# Patient Record
Sex: FEMALE | Race: WHITE | Employment: PART TIME | URBAN - METROPOLITAN AREA
[De-identification: names, ages, dates, MRNs, and addresses within clinical notes are randomized per-mention and may not be internally consistent; named-entity substitution may affect disease eponyms.]

---

## 2023-03-27 ENCOUNTER — TELEPHONE (OUTPATIENT)
Dept: PRIMARY CARE | Facility: CLINIC | Age: 73
End: 2023-03-27
Payer: MEDICARE

## 2023-03-27 DIAGNOSIS — J01.90 ACUTE SINUSITIS, RECURRENCE NOT SPECIFIED, UNSPECIFIED LOCATION: Primary | ICD-10-CM

## 2023-03-27 RX ORDER — AZITHROMYCIN 250 MG/1
TABLET, FILM COATED ORAL
Qty: 6 TABLET | Refills: 0 | Status: SHIPPED | OUTPATIENT
Start: 2023-03-27 | End: 2023-04-01

## 2023-03-27 NOTE — TELEPHONE ENCOUNTER
Pt is requesting an appointment with Dr. Milligan. Pt has sinus congestion, productive cough and fatigue. Pt states that she has no fever and that symptoms began about 3/19. Pt says that she usually gets spring allergies and believes that she has a sinus infection. Please advise.   Pt phone # [phone]

## 2023-08-31 ENCOUNTER — TELEPHONE (OUTPATIENT)
Dept: PRIMARY CARE | Facility: CLINIC | Age: 73
End: 2023-08-31
Payer: MEDICARE

## 2023-08-31 DIAGNOSIS — J01.90 ACUTE SINUSITIS, RECURRENCE NOT SPECIFIED, UNSPECIFIED LOCATION: Primary | ICD-10-CM

## 2023-08-31 RX ORDER — AMOXICILLIN 875 MG/1
875 TABLET, FILM COATED ORAL 2 TIMES DAILY
Qty: 20 TABLET | Refills: 0 | Status: SHIPPED | OUTPATIENT
Start: 2023-08-31 | End: 2023-09-10

## 2023-08-31 NOTE — TELEPHONE ENCOUNTER
Per pt, she has been having congestion for a week, discolored mucus, headaches, fatigue, dry cough, sore throat. She has no fever, no body aches, no chills. She had a negative test 2 days ago. She has been taking Tylenol for sinus a couple of times a day. She is asking if anything can be called in.

## 2023-10-05 ENCOUNTER — PATIENT OUTREACH (OUTPATIENT)
Dept: CARE COORDINATION | Facility: CLINIC | Age: 73
End: 2023-10-05
Payer: MEDICARE

## 2023-10-05 NOTE — PROGRESS NOTES
Left message for patient to assist in scheduling Annual Wellness Exam.   Introduced myself as their Patient Navigator for their PCP office and how I can assist them in the future.     I asked patient to call back directly at [phone] ; I am able to assist in scheduling Annual Wellness Exam along with any other resources they might need.     Patient Outreach Tracker Noted

## 2023-12-01 ENCOUNTER — LAB (OUTPATIENT)
Dept: LAB | Facility: LAB | Age: 73
End: 2023-12-01
Payer: MEDICARE

## 2023-12-01 DIAGNOSIS — R10.84 GENERALIZED ABDOMINAL PAIN: ICD-10-CM

## 2023-12-01 LAB
CREAT SERPL-MCNC: 0.63 MG/DL (ref 0.5–1.05)
GFR SERPL CREATININE-BSD FRML MDRD: >90 ML/MIN/1.73M*2

## 2023-12-01 PROCEDURE — 36415 COLL VENOUS BLD VENIPUNCTURE: CPT

## 2023-12-01 PROCEDURE — 82565 ASSAY OF CREATININE: CPT

## 2023-12-11 ENCOUNTER — HOSPITAL ENCOUNTER (OUTPATIENT)
Dept: RADIOLOGY | Facility: HOSPITAL | Age: 73
Discharge: HOME | End: 2023-12-11
Payer: MEDICARE

## 2023-12-11 DIAGNOSIS — R10.84 GENERALIZED ABDOMINAL PAIN: ICD-10-CM

## 2023-12-11 PROCEDURE — 2550000001 HC RX 255 CONTRASTS: Performed by: FAMILY MEDICINE

## 2023-12-11 PROCEDURE — 74177 CT ABD & PELVIS W/CONTRAST: CPT

## 2023-12-11 PROCEDURE — 74177 CT ABD & PELVIS W/CONTRAST: CPT | Performed by: STUDENT IN AN ORGANIZED HEALTH CARE EDUCATION/TRAINING PROGRAM

## 2023-12-11 RX ADMIN — IOHEXOL 75 ML: 350 INJECTION, SOLUTION INTRAVENOUS at 16:32

## 2023-12-14 ENCOUNTER — TELEPHONE (OUTPATIENT)
Dept: PRIMARY CARE | Facility: CLINIC | Age: 73
End: 2023-12-14
Payer: MEDICARE

## 2023-12-14 DIAGNOSIS — E78.2 MIXED HYPERLIPIDEMIA: Primary | ICD-10-CM

## 2023-12-14 DIAGNOSIS — Z00.00 HEALTHCARE MAINTENANCE: ICD-10-CM

## 2023-12-14 DIAGNOSIS — E03.9 ACQUIRED HYPOTHYROIDISM: ICD-10-CM

## 2023-12-14 PROBLEM — E78.5 HYPERLIPIDEMIA: Status: ACTIVE | Noted: 2023-12-14

## 2023-12-14 PROBLEM — M85.80 AGE-RELATED BONE LOSS: Status: ACTIVE | Noted: 2023-12-14

## 2023-12-18 ENCOUNTER — LAB (OUTPATIENT)
Dept: LAB | Facility: LAB | Age: 73
End: 2023-12-18
Payer: MEDICARE

## 2023-12-18 DIAGNOSIS — Z00.00 HEALTHCARE MAINTENANCE: ICD-10-CM

## 2023-12-18 DIAGNOSIS — E03.9 ACQUIRED HYPOTHYROIDISM: ICD-10-CM

## 2023-12-18 DIAGNOSIS — E03.9 HYPOTHYROIDISM, UNSPECIFIED: ICD-10-CM

## 2023-12-18 DIAGNOSIS — E78.2 MIXED HYPERLIPIDEMIA: ICD-10-CM

## 2023-12-18 LAB
ALBUMIN SERPL BCP-MCNC: 4.1 G/DL (ref 3.4–5)
ALP SERPL-CCNC: 74 U/L (ref 33–136)
ALT SERPL W P-5'-P-CCNC: 10 U/L (ref 7–45)
ANION GAP SERPL CALC-SCNC: 11 MMOL/L (ref 10–20)
AST SERPL W P-5'-P-CCNC: 14 U/L (ref 9–39)
BILIRUB SERPL-MCNC: 0.7 MG/DL (ref 0–1.2)
BUN SERPL-MCNC: 15 MG/DL (ref 6–23)
CALCIUM SERPL-MCNC: 9 MG/DL (ref 8.6–10.3)
CHLORIDE SERPL-SCNC: 105 MMOL/L (ref 98–107)
CHOLEST SERPL-MCNC: 178 MG/DL (ref 0–199)
CHOLESTEROL/HDL RATIO: 2.4
CO2 SERPL-SCNC: 29 MMOL/L (ref 21–32)
CREAT SERPL-MCNC: 0.67 MG/DL (ref 0.5–1.05)
ERYTHROCYTE [DISTWIDTH] IN BLOOD BY AUTOMATED COUNT: 13.1 % (ref 11.5–14.5)
GFR SERPL CREATININE-BSD FRML MDRD: >90 ML/MIN/1.73M*2
GLUCOSE SERPL-MCNC: 98 MG/DL (ref 74–99)
HCT VFR BLD AUTO: 44.1 % (ref 36–46)
HDLC SERPL-MCNC: 75.4 MG/DL
HGB BLD-MCNC: 14 G/DL (ref 12–16)
LDLC SERPL CALC-MCNC: 85 MG/DL
MCH RBC QN AUTO: 29.2 PG (ref 26–34)
MCHC RBC AUTO-ENTMCNC: 31.7 G/DL (ref 32–36)
MCV RBC AUTO: 92 FL (ref 80–100)
NON HDL CHOLESTEROL: 103 MG/DL (ref 0–149)
NRBC BLD-RTO: 0 /100 WBCS (ref 0–0)
PLATELET # BLD AUTO: 319 X10*3/UL (ref 150–450)
POTASSIUM SERPL-SCNC: 4.4 MMOL/L (ref 3.5–5.3)
PROT SERPL-MCNC: 6.6 G/DL (ref 6.4–8.2)
RBC # BLD AUTO: 4.79 X10*6/UL (ref 4–5.2)
SODIUM SERPL-SCNC: 141 MMOL/L (ref 136–145)
TRIGL SERPL-MCNC: 89 MG/DL (ref 0–149)
TSH SERPL-ACNC: 0.88 MIU/L (ref 0.44–3.98)
VLDL: 18 MG/DL (ref 0–40)
WBC # BLD AUTO: 5.2 X10*3/UL (ref 4.4–11.3)

## 2023-12-18 PROCEDURE — 80061 LIPID PANEL: CPT

## 2023-12-18 PROCEDURE — 85027 COMPLETE CBC AUTOMATED: CPT

## 2023-12-18 PROCEDURE — 84443 ASSAY THYROID STIM HORMONE: CPT

## 2023-12-18 PROCEDURE — 36415 COLL VENOUS BLD VENIPUNCTURE: CPT

## 2023-12-18 PROCEDURE — 80053 COMPREHEN METABOLIC PANEL: CPT

## 2023-12-18 RX ORDER — LEVOTHYROXINE SODIUM 88 UG/1
88 TABLET ORAL DAILY
Qty: 90 TABLET | Refills: 3 | Status: SHIPPED | OUTPATIENT
Start: 2023-12-18

## 2023-12-20 ENCOUNTER — OFFICE VISIT (OUTPATIENT)
Dept: PRIMARY CARE | Facility: CLINIC | Age: 73
End: 2023-12-20
Payer: MEDICARE

## 2023-12-20 VITALS
TEMPERATURE: 97.8 F | SYSTOLIC BLOOD PRESSURE: 124 MMHG | DIASTOLIC BLOOD PRESSURE: 76 MMHG | BODY MASS INDEX: 23.07 KG/M2 | RESPIRATION RATE: 16 BRPM | WEIGHT: 147 LBS | HEART RATE: 74 BPM | HEIGHT: 67 IN | OXYGEN SATURATION: 98 %

## 2023-12-20 DIAGNOSIS — K42.9 UMBILICAL HERNIA WITHOUT OBSTRUCTION AND WITHOUT GANGRENE: ICD-10-CM

## 2023-12-20 DIAGNOSIS — R05.9 COUGH, UNSPECIFIED TYPE: ICD-10-CM

## 2023-12-20 DIAGNOSIS — Z00.00 ROUTINE GENERAL MEDICAL EXAMINATION AT HEALTH CARE FACILITY: Primary | ICD-10-CM

## 2023-12-20 PROCEDURE — 1125F AMNT PAIN NOTED PAIN PRSNT: CPT | Performed by: FAMILY MEDICINE

## 2023-12-20 PROCEDURE — 1159F MED LIST DOCD IN RCRD: CPT | Performed by: FAMILY MEDICINE

## 2023-12-20 PROCEDURE — G0439 PPPS, SUBSEQ VISIT: HCPCS | Performed by: FAMILY MEDICINE

## 2023-12-20 PROCEDURE — 1170F FXNL STATUS ASSESSED: CPT | Performed by: FAMILY MEDICINE

## 2023-12-20 PROCEDURE — 1036F TOBACCO NON-USER: CPT | Performed by: FAMILY MEDICINE

## 2023-12-20 RX ORDER — ALBUTEROL SULFATE 90 UG/1
2 AEROSOL, METERED RESPIRATORY (INHALATION) EVERY 4 HOURS PRN
Qty: 18 G | Refills: 3 | Status: SHIPPED | OUTPATIENT
Start: 2023-12-20

## 2023-12-20 RX ORDER — ALBUTEROL SULFATE 90 UG/1
2 AEROSOL, METERED RESPIRATORY (INHALATION) EVERY 4 HOURS PRN
COMMUNITY
Start: 2023-08-20 | End: 2023-12-20 | Stop reason: SDUPTHER

## 2023-12-20 RX ORDER — OMEPRAZOLE 40 MG/1
40 CAPSULE, DELAYED RELEASE ORAL AS NEEDED
COMMUNITY

## 2023-12-20 ASSESSMENT — ACTIVITIES OF DAILY LIVING (ADL)
GROCERY_SHOPPING: INDEPENDENT
TAKING_MEDICATION: INDEPENDENT
DOING_HOUSEWORK: INDEPENDENT
BATHING: INDEPENDENT
DRESSING: INDEPENDENT
MANAGING_FINANCES: INDEPENDENT

## 2023-12-20 ASSESSMENT — ENCOUNTER SYMPTOMS
OCCASIONAL FEELINGS OF UNSTEADINESS: 0
ENDOCRINE NEGATIVE: 1
RESPIRATORY NEGATIVE: 1
CONSTITUTIONAL NEGATIVE: 1
PSYCHIATRIC NEGATIVE: 1
GASTROINTESTINAL NEGATIVE: 1
LOSS OF SENSATION IN FEET: 0
DEPRESSION: 0
CARDIOVASCULAR NEGATIVE: 1
EYES NEGATIVE: 1
NEUROLOGICAL NEGATIVE: 1

## 2023-12-20 ASSESSMENT — PATIENT HEALTH QUESTIONNAIRE - PHQ9
2. FEELING DOWN, DEPRESSED OR HOPELESS: NOT AT ALL
SUM OF ALL RESPONSES TO PHQ9 QUESTIONS 1 AND 2: 0
1. LITTLE INTEREST OR PLEASURE IN DOING THINGS: NOT AT ALL

## 2023-12-20 NOTE — PROGRESS NOTES
"Subjective   Reason for Visit: Susannah Quintanilla is an 73 y.o. female here for a Medicare Wellness visit.          Reviewed all medications by prescribing practitioner or clinical pharmacist (such as prescriptions, OTCs, herbal therapies and supplements) and documented in the medical record.  Had an episode of dysphagia with food getting caught. Had EGD with dilation done. 11/29/23    Patient Care Team:  Nela Milligan DO as PCP - General  Nela Milligan DO as PCP - [facility] [employer] PCP     Review of Systems   Constitutional: Negative.    HENT: Negative.     Eyes: Negative.    Respiratory: Negative.     Cardiovascular: Negative.    Gastrointestinal: Negative.    Endocrine: Negative.    Genitourinary: Negative.    Skin: Negative.    Neurological: Negative.    Psychiatric/Behavioral: Negative.         Objective   Vitals:  /76 (BP Location: Left arm, Patient Position: Sitting)   Pulse 74   Temp 36.6 °C (97.8 °F)   Resp 16   Ht 1.702 m (5' 7\")   Wt 66.7 kg (147 lb)   SpO2 98%   BMI 23.02 kg/m²       Physical Exam  Constitutional:       General: She is not in acute distress.     Appearance: Normal appearance.   HENT:      Head: Normocephalic and atraumatic.      Right Ear: Tympanic membrane normal.      Left Ear: Tympanic membrane normal.      Nose: Nose normal.      Mouth/Throat:      Pharynx: Oropharynx is clear.   Eyes:      Conjunctiva/sclera: Conjunctivae normal.      Pupils: Pupils are equal, round, and reactive to light.   Neck:      Vascular: No carotid bruit.   Cardiovascular:      Rate and Rhythm: Normal rate and regular rhythm.      Heart sounds: Normal heart sounds.   Pulmonary:      Effort: Pulmonary effort is normal.      Breath sounds: Normal breath sounds.   Abdominal:      General: Bowel sounds are normal.      Palpations: Abdomen is soft.      Hernia: A hernia (umbilical) is present.   Musculoskeletal:      Cervical back: Neck supple. No tenderness.   Skin:     General: Skin is " warm and dry.   Neurological:      General: No focal deficit present.      Mental Status: She is alert.   Psychiatric:         Mood and Affect: Mood normal.         Behavior: Behavior normal.         Assessment/Plan   Problem List Items Addressed This Visit    None  Visit Diagnoses       Routine general medical examination at health care facility    -  Primary    Cough, unspecified type        Relevant Medications    albuterol 90 mcg/actuation inhaler    Umbilical hernia without obstruction and without gangrene        Relevant Orders    Referral to General Surgery        Dr. Baker

## 2023-12-21 ENCOUNTER — APPOINTMENT (OUTPATIENT)
Dept: PRIMARY CARE | Facility: CLINIC | Age: 73
End: 2023-12-21
Payer: MEDICARE

## 2024-01-04 ENCOUNTER — TELEPHONE (OUTPATIENT)
Dept: PRIMARY CARE | Facility: CLINIC | Age: 74
End: 2024-01-04
Payer: MEDICARE

## 2024-01-04 NOTE — TELEPHONE ENCOUNTER
Pt left a message on 's VM stating that she has tried to see the general surgeon she was referred to but she was told that he was an affiliate. She is asking for clarification.

## 2024-01-05 NOTE — TELEPHONE ENCOUNTER
Pt was called to clarify. She stated that when she called to schedule with Dr. Baker, the  told her he is an affiliate and he is with [facility]. She wants to make sure that she can still see him.

## 2024-02-20 ENCOUNTER — OFFICE VISIT (OUTPATIENT)
Dept: SURGERY | Facility: CLINIC | Age: 74
End: 2024-02-20
Payer: MEDICARE

## 2024-02-20 VITALS
DIASTOLIC BLOOD PRESSURE: 89 MMHG | HEIGHT: 67 IN | SYSTOLIC BLOOD PRESSURE: 154 MMHG | BODY MASS INDEX: 22.63 KG/M2 | HEART RATE: 67 BPM | OXYGEN SATURATION: 98 % | RESPIRATION RATE: 16 BRPM | WEIGHT: 144.2 LBS | TEMPERATURE: 97 F

## 2024-02-20 DIAGNOSIS — K42.9 UMBILICAL HERNIA WITHOUT OBSTRUCTION AND WITHOUT GANGRENE: Primary | ICD-10-CM

## 2024-02-20 DIAGNOSIS — K42.9 UMBILICAL HERNIA WITHOUT OBSTRUCTION AND WITHOUT GANGRENE: ICD-10-CM

## 2024-02-20 PROCEDURE — 99213 OFFICE O/P EST LOW 20 MIN: CPT | Performed by: SURGERY

## 2024-02-20 PROCEDURE — 1036F TOBACCO NON-USER: CPT | Performed by: SURGERY

## 2024-02-20 PROCEDURE — 1159F MED LIST DOCD IN RCRD: CPT | Performed by: SURGERY

## 2024-02-20 PROCEDURE — 1125F AMNT PAIN NOTED PAIN PRSNT: CPT | Performed by: SURGERY

## 2024-02-20 NOTE — PROGRESS NOTES
Susannah Quintanilla   [id number]   1950         Chief Complaint/    Umbilical hernia        HPI/    73-year-old female seen for an umbilical hernia    Patient has a hernia at umbilicus.  She is now about every year or 2.  Starting to get bigger and hurts    Patient is status post prior laparoscopic procedure via the umbilicus in the past        No past medical history on file.   Hypothyroid    Seasonal allergies    Denies hypertension, diabetes, coronary artery disease  Social History     Socioeconomic History    Marital status:      Spouse name: Not on file    Number of children: Not on file    Years of education: Not on file    Highest education level: Not on file   Occupational History    Not on file   Tobacco Use    Smoking status: Never    Smokeless tobacco: Never   Vaping Use    Vaping Use: Never used   Substance and Sexual Activity    Alcohol use: Not Currently    Drug use: Never    Sexual activity: Not on file   Other Topics Concern    Not on file   Social History Narrative    Not on file     Social Determinants of Health     Financial Resource Strain: Not on file   Food Insecurity: Not on file   Transportation Needs: Not on file   Physical Activity: Not on file   Stress: Not on file   Social Connections: Not on file   Intimate Partner Violence: Not on file   Housing Stability: Not on file        Non-smoker  No family history on file.     Review of Systems   All other systems reviewed and are negative.         Current Outpatient Medications:     albuterol 90 mcg/actuation inhaler, Inhale 2 puffs every 4 hours if needed for wheezing., Disp: 18 g, Rfl: 3    levothyroxine (Synthroid, Levoxyl) 88 mcg tablet, TAKE 1 TABLET BY MOUTH EVERY DAY, Disp: 90 tablet, Rfl: 3    omeprazole (PriLOSEC) 40 mg DR capsule, Take 1 capsule (40 mg) by mouth once daily., Disp: , Rfl:      No Known Allergies     CT abdomen pelvis w IV contrast  Narrative: Interpreted By:  Bartolome Raines,   STUDY:  CT ABDOMEN PELVIS W IV  CONTRAST;  12/11/2023 4:41 pm      INDICATION:  Abdominal pain.      COMPARISON:  None      ACCESSION NUMBER(S):  [id number]      ORDERING CLINICIAN:  BRIAN HARRISON      TECHNIQUE:  Axial CT of the abdomen and pelvis was performed following  intravenous administration of 75 ml of contrast Omnipaque 350 with  coronal and sagittal reconstruction.      FINDINGS:  LOWER CHEST:  No focal consolidation or pleural effusion.      ABDOMEN:      LIVER:  No mass.      BILE DUCTS:  No intrahepatic or extrahepatic bile duct dilation.      GALLBLADDER:  Cholecystectomy.      PANCREAS:  No mass or duct dilation.      SPLEEN:  No mass. No splenomegaly.      ADRENAL GLANDS:  Normal.      KIDNEYS AND URETERS:  A few bilateral subcentimeter lesions too small to characterize,  likely benign. No calculus or hydronephrosis.      PELVIS:      BLADDER:  No wall thickening or calculus.      REPRODUCTIVE ORGANS:  Anteverted uterus with endometrial thickening, 8 mm (sagittal series  301, image 55).      BOWEL:  Small hiatal hernia. No bowel wall thickening or dilation. The  appendix is normal.      VESSELS:  Minimal abdominal aortic atherosclerotic calcifications without  aneurysmal dilation. The celiac trunk and SMA are patent. The  hepatic, portal, superior mesenteric and splenic veins are well  opacified. The IVC is normal.      PERITONEUM/RETROPERITONEUM/LYMPH NODES:  No ascites, free air or fluid collection. No abdominopelvic  lymphadenopathy.      ABDOMINAL WALL AND BONES:  No acute osseous or abdominal wall soft tissue abnormalities.Fat  containing umbilical hernia, hernia sac 3.0 x 2.7 x 4.6 cm, hernia  neck 1.6 cm. Mild L5-S1 degenerative disc disease.      Impression: Colonic diverticulosis without acute diverticulitis or bowel  obstruction.      Small fat containing umbilical hernia, hernia sac 3.0 x 2.7 x 4.6 cm,  hernia neck 1.6 cm.      Endometrial thickening, 8 mm. Consider further evaluation with female  pelvic  "ultrasound.      MACRO:  None      Signed by: Bartolome Raines 12/12/2023 7:54 AM  Dictation workstation:   [id number]       [unfilled]     /89 (BP Location: Right arm, Patient Position: Sitting, BP Cuff Size: Adult)   Pulse 67   Temp 36.1 °C (97 °F) (Temporal)   Resp 16   Ht 1.702 m (5' 7\")   Wt 65.4 kg (144 lb 3.2 oz)   SpO2 98%   BMI 22.58 kg/m²        Physical Exam  Constitutional:       Appearance: Normal appearance.   HENT:      Head: Normocephalic and atraumatic.   Cardiovascular:      Rate and Rhythm: Normal rate and regular rhythm.   Pulmonary:      Effort: Pulmonary effort is normal.      Breath sounds: Normal breath sounds.   Abdominal:      Comments: Hernia at umbilicus noted    Scar consistent with laparoscopic surgery also noted    I cannot completely reduce this   Musculoskeletal:         General: Normal range of motion.      Cervical back: Normal range of motion.   Skin:     General: Skin is warm.   Neurological:      General: No focal deficit present.      Mental Status: She is alert and oriented to person, place, and time.                  Assessment/    Incisional hernia prior laparoscopic port site    Plan/      Open repair  General anesthesia  Mesh    Risk benefits indications for surgery reviewed with the patient.  Potential bleeding infection MI PE death etc. reviewed.  The anticipated convalescence is reviewed.  Use of mesh and prosthetic materials is reviewed.  All questions were answered and consent is obtained    "

## 2024-03-08 PROCEDURE — 87086 URINE CULTURE/COLONY COUNT: CPT

## 2024-03-09 ENCOUNTER — LAB REQUISITION (OUTPATIENT)
Dept: LAB | Facility: HOSPITAL | Age: 74
End: 2024-03-09
Payer: MEDICARE

## 2024-03-09 DIAGNOSIS — R39.15 URGENCY OF URINATION: ICD-10-CM

## 2024-03-09 DIAGNOSIS — R35.0 FREQUENCY OF MICTURITION: ICD-10-CM

## 2024-03-09 DIAGNOSIS — N30.00 ACUTE CYSTITIS WITHOUT HEMATURIA: ICD-10-CM

## 2024-03-10 LAB — BACTERIA UR CULT: NORMAL

## 2024-03-13 ASSESSMENT — ENCOUNTER SYMPTOMS
NECK NEGATIVE: 1
CONSTITUTIONAL NEGATIVE: 1
EYES NEGATIVE: 1
NEUROLOGICAL NEGATIVE: 1
RESPIRATORY NEGATIVE: 1
CARDIOVASCULAR NEGATIVE: 1

## 2024-03-13 NOTE — CPM/PAT H&P
CPM/PAT Evaluation       Name: Susannah Quintanilla (Susannah Quintanilla)  /Age: 1950/74 y.o.     In-Person       Chief Complaint: umbilical hernia    HPI  This is a very pleasant 73 yo with Pmhx of GERD, hypothyroid, and umbilical hernia.  Pt states she has had hernia for a long time.  Recently it has increased in size and is uncomfortable.  She does report recent episodes of constipation.  No acute abd pain.  No recent fever or chills.    Past Medical History:   Diagnosis Date    GERD (gastroesophageal reflux disease)     Hypothyroidism     Umbilical hernia        Past Surgical History:   Procedure Laterality Date    OTHER SURGICAL HISTORY  2019    Cataract surgery    OTHER SURGICAL HISTORY  2019    Cholecystectomy    OTHER SURGICAL HISTORY  2019    Loop electrosurgical excision procedure       Patient  reports that she is not currently sexually active.    Family History   Problem Relation Name Age of Onset    Breast cancer Mother      Coronary artery disease Father      Hypertension Brother         No Known Allergies    Prior to Admission medications    Medication Sig Start Date End Date Taking? Authorizing Provider   albuterol 90 mcg/actuation inhaler Inhale 2 puffs every 4 hours if needed for wheezing. 23   Nela Milligan, DO   levothyroxine (Synthroid, Levoxyl) 88 mcg tablet TAKE 1 TABLET BY MOUTH EVERY DAY 23   Nela Milligan DO   omeprazole (PriLOSEC) 40 mg DR capsule Take 1 capsule (40 mg) by mouth once daily.    Historical Provider, MD TORRES ROS:   Constitutional:   neg    Neuro/Psych:   neg    Eyes:   neg     use of corrective lenses  Ears:   neg    Nose:   neg    Mouth:   neg    Throat:   neg    Neck:   neg    Cardio:   neg    Respiratory:   neg    Endocrine:    On synthroid good control  GI:    See HPI  Umbilical hernia  Hx of GERD   constipation  :   neg    Musculoskeletal:    Right knee arthritis takes tylenol for pain  Hematologic:   neg    Skin:  neg         Physical Exam  Constitutional:       Appearance: Normal appearance.   HENT:      Head: Normocephalic and atraumatic.      Nose: Nose normal.      Mouth/Throat:      Mouth: Mucous membranes are moist.   Eyes:      Pupils: Pupils are equal, round, and reactive to light.   Cardiovascular:      Rate and Rhythm: Normal rate and regular rhythm.   Pulmonary:      Effort: Pulmonary effort is normal.      Breath sounds: Normal breath sounds.   Abdominal:      General: Bowel sounds are normal.      Palpations: Abdomen is soft.   Musculoskeletal:         General: Normal range of motion.      Cervical back: Normal range of motion.   Skin:     General: Skin is warm and dry.   Neurological:      General: No focal deficit present.      Mental Status: She is alert and oriented to person, place, and time.   Psychiatric:         Mood and Affect: Mood normal.         Behavior: Behavior normal.          Airway full ROM  Teeth:  partial  Anesthesia:  Patient denies any anesthesia complications.   ECG:   flipped T waves in lateral leads new in comparison to ECG of 12/2022  Lab Results   Component Value Date    GLUCOSE 112 (H) 03/14/2024    CALCIUM 9.2 03/14/2024     03/14/2024    K 4.3 03/14/2024    CO2 30 03/14/2024     03/14/2024    BUN 12 03/14/2024    CREATININE 0.61 03/14/2024     Lab Results   Component Value Date    GLUCOSE 112 (H) 03/14/2024    CALCIUM 9.2 03/14/2024     03/14/2024    K 4.3 03/14/2024    CO2 30 03/14/2024     03/14/2024    BUN 12 03/14/2024    CREATININE 0.61 03/14/2024           Visit Vitals  /72   Pulse 65   Temp 36 °C (96.8 °F) (Temporal)   Resp 16       DASI Risk Score      Flowsheet Row Most Recent Value   DASI SCORE 39.45   METS Score (Will be calculated only when all the questions are answered) 7.6          Caprini DVT Assessment      Flowsheet Row Most Recent Value   DVT Score 6   Current Status Major surgery planned, including arthroscopic and laproscopic (1-2 hours)    Age Over 75 years   BMI 30 or less          Modified Frailty Index      Flowsheet Row Most Recent Value   Modified Frailty Index Calculator 0          CHADS2 Stroke Risk         N/A 3 - 100%: High Risk   2 - 3%: Medium Risk   0 - 2%: Low Risk     Last Change: N/A          This score determines the patient's risk of having a stroke if the patient has atrial fibrillation.        This score is not applicable to this patient. Components are not calculated.          Revised Cardiac Risk Index      Flowsheet Row Most Recent Value   Revised Cardiac Risk Calculator 0          Apfel Simplified Score    No data to display       Risk Analysis Index Results This Encounter         3/14/2024  0808             CRUM Cancer History: Patient does not indicate history of cancer    Total Risk Analysis Index Score Without Cancer: 24    Total Risk Analysis Index Score: 24          Stop Bang Score      Flowsheet Row Most Recent Value   Do you snore loudly? 0   Do you often feel tired or fatigued after your sleep? 0   Has anyone ever observed you stop breathing in your sleep? 0   Do you have or are you being treated for high blood pressure? 0   Recent BMI (Calculated) 22.4   Is BMI greater than 35 kg/m2? 0=No   Age older than 50 years old? 1=Yes   Is your neck circumference greater than 17 inches (Male) or 16 inches (Female)? 0   Gender - Male 0=No   STOP-BANG Total Score 1            Assessment and Plan:     Plan for OR on 4/3 Repair of incisional hernia at laparoscopic port site of umbilicus   BMP CBC  ECG changes- requesting cardiac clearance appt made for 3/20 9am with Tracy Schwab

## 2024-03-14 ENCOUNTER — LAB (OUTPATIENT)
Dept: LAB | Facility: LAB | Age: 74
End: 2024-03-14
Payer: MEDICARE

## 2024-03-14 ENCOUNTER — PRE-ADMISSION TESTING (OUTPATIENT)
Dept: PREADMISSION TESTING | Facility: HOSPITAL | Age: 74
End: 2024-03-14
Payer: MEDICARE

## 2024-03-14 VITALS
WEIGHT: 143.08 LBS | HEART RATE: 65 BPM | RESPIRATION RATE: 16 BRPM | DIASTOLIC BLOOD PRESSURE: 72 MMHG | OXYGEN SATURATION: 98 % | HEIGHT: 67 IN | SYSTOLIC BLOOD PRESSURE: 140 MMHG | TEMPERATURE: 96.8 F | BODY MASS INDEX: 22.46 KG/M2

## 2024-03-14 DIAGNOSIS — K42.9 UMBILICAL HERNIA WITHOUT OBSTRUCTION AND WITHOUT GANGRENE: ICD-10-CM

## 2024-03-14 LAB
ANION GAP SERPL CALC-SCNC: 8 MMOL/L (ref 10–20)
BUN SERPL-MCNC: 12 MG/DL (ref 6–23)
CALCIUM SERPL-MCNC: 9.2 MG/DL (ref 8.6–10.3)
CHLORIDE SERPL-SCNC: 106 MMOL/L (ref 98–107)
CO2 SERPL-SCNC: 30 MMOL/L (ref 21–32)
CREAT SERPL-MCNC: 0.61 MG/DL (ref 0.5–1.05)
EGFRCR SERPLBLD CKD-EPI 2021: >90 ML/MIN/1.73M*2
ERYTHROCYTE [DISTWIDTH] IN BLOOD BY AUTOMATED COUNT: 12.5 % (ref 11.5–14.5)
GLUCOSE SERPL-MCNC: 112 MG/DL (ref 74–99)
HCT VFR BLD AUTO: 43.9 % (ref 36–46)
HGB BLD-MCNC: 14.1 G/DL (ref 12–16)
MCH RBC QN AUTO: 29 PG (ref 26–34)
MCHC RBC AUTO-ENTMCNC: 32.1 G/DL (ref 32–36)
MCV RBC AUTO: 90 FL (ref 80–100)
NRBC BLD-RTO: 0 /100 WBCS (ref 0–0)
PLATELET # BLD AUTO: 333 X10*3/UL (ref 150–450)
POTASSIUM SERPL-SCNC: 4.3 MMOL/L (ref 3.5–5.3)
RBC # BLD AUTO: 4.86 X10*6/UL (ref 4–5.2)
SODIUM SERPL-SCNC: 140 MMOL/L (ref 136–145)
WBC # BLD AUTO: 5.1 X10*3/UL (ref 4.4–11.3)

## 2024-03-14 PROCEDURE — 80048 BASIC METABOLIC PNL TOTAL CA: CPT

## 2024-03-14 PROCEDURE — 99203 OFFICE O/P NEW LOW 30 MIN: CPT | Performed by: NURSE PRACTITIONER

## 2024-03-14 PROCEDURE — 85027 COMPLETE CBC AUTOMATED: CPT

## 2024-03-14 PROCEDURE — 93010 ELECTROCARDIOGRAM REPORT: CPT | Performed by: INTERNAL MEDICINE

## 2024-03-14 PROCEDURE — 36415 COLL VENOUS BLD VENIPUNCTURE: CPT

## 2024-03-14 PROCEDURE — 93005 ELECTROCARDIOGRAM TRACING: CPT

## 2024-03-14 RX ORDER — IBUPROFEN 200 MG
2 CAPSULE ORAL
COMMUNITY

## 2024-03-14 RX ORDER — CETIRIZINE HYDROCHLORIDE 10 MG/1
10 TABLET ORAL DAILY PRN
COMMUNITY

## 2024-03-14 RX ORDER — DEXTROMETHORPHAN HYDROBROMIDE, GUAIFENESIN 5; 100 MG/5ML; MG/5ML
650 LIQUID ORAL EVERY 8 HOURS PRN
COMMUNITY

## 2024-03-14 ASSESSMENT — CHADS2 SCORE
CHF: NO
HYPERTENSION: NO
DIABETES: NO
AGE GREATER THAN OR EQUAL TO 75: YES
CHADS2 SCORE: 1
PRIOR STROKE OR TIA OR THROMBOEMBOLISM: NO

## 2024-03-14 ASSESSMENT — ACTIVITIES OF DAILY LIVING (ADL): ADL_SCORE: 0

## 2024-03-14 ASSESSMENT — DUKE ACTIVITY SCORE INDEX (DASI)
CAN YOU PARTICIPATE IN MODERATE RECREATIONAL ACTIVITIES LIKE GOLF, BOWLING, DANCING, DOUBLES TENNIS OR THROWING A BASEBALL OR FOOTBALL: NO
CAN YOU DO HEAVY WORK AROUND THE HOUSE LIKE SCRUBBING FLOORS OR LIFTING AND MOVING HEAVY FURNITURE: YES
CAN YOU WALK A BLOCK OR TWO ON LEVEL GROUND: YES
TOTAL_SCORE: 39.45
CAN YOU DO LIGHT WORK AROUND THE HOUSE LIKE DUSTING OR WASHING DISHES: YES
CAN YOU PARTICIPATE IN STRENOUS SPORTS LIKE SWIMMING, SINGLES TENNIS, FOOTBALL, BASKETBALL, OR SKIING: NO
CAN YOU TAKE CARE OF YOURSELF (EAT, DRESS, BATHE, OR USE TOILET): YES
CAN YOU RUN A SHORT DISTANCE: YES
CAN YOU CLIMB A FLIGHT OF STAIRS OR WALK UP A HILL: YES
DASI METS SCORE: 7.6
CAN YOU DO YARD WORK LIKE RAKING LEAVES, WEEDING OR PUSHING A MOWER: YES
CAN YOU DO MODERATE WORK AROUND THE HOUSE LIKE VACUUMING, SWEEPING FLOORS OR CARRYING GROCERIES: YES
CAN YOU WALK INDOORS, SUCH AS AROUND YOUR HOUSE: YES
CAN YOU HAVE SEXUAL RELATIONS: NO

## 2024-03-14 ASSESSMENT — LIFESTYLE VARIABLES: SMOKING_STATUS: NONSMOKER

## 2024-03-14 ASSESSMENT — ENCOUNTER SYMPTOMS: CONSTIPATION: 1

## 2024-03-14 ASSESSMENT — PAIN - FUNCTIONAL ASSESSMENT: PAIN_FUNCTIONAL_ASSESSMENT: 0-10

## 2024-03-14 ASSESSMENT — PAIN SCALES - GENERAL: PAINLEVEL_OUTOF10: 0 - NO PAIN

## 2024-03-14 NOTE — PREPROCEDURE INSTRUCTIONS
Medication List            Accurate as of March 14, 2024  8:31 AM. Always use your most recent med list.                acetaminophen 650 mg ER tablet  Commonly known as: Tylenol 8 HOUR  Medication Adjustments for Surgery: Take morning of surgery with sip of water, no other fluids     albuterol 90 mcg/actuation inhaler  Inhale 2 puffs every 4 hours if needed for wheezing.  Notes to patient: Can take the morning of surgery if needed     calcium carbonate-vitamin D3 500 mg-3.125 mcg (125 unit) tablet tablet  Medication Adjustments for Surgery: Stop 7 days before surgery     levothyroxine 88 mcg tablet  Commonly known as: Synthroid, Levoxyl  TAKE 1 TABLET BY MOUTH EVERY DAY  Medication Adjustments for Surgery: Take morning of surgery with sip of water, no other fluids     omeprazole 40 mg DR capsule  Commonly known as: PriLOSEC  Medication Adjustments for Surgery: Take morning of surgery with sip of water, no other fluids     ZyrTEC 10 mg tablet  Generic drug: cetirizine  Medication Adjustments for Surgery: Stop 1 day before surgery                     PRE-OPERATIVE INSTRUCTIONS    You will receive notification one business day prior to your procedure to confirm your arrival time. It is important that you answer your phone and/or check your messages during this time. If you do not hear from the surgery center by 5 pm. the day before your procedure, please call [phone].     Please enter the building through the Outpatient entrance and take the elevator off the lobby to the 2nd floor then check in at the Outpatient Surgery desk on the 2nd floor.    INSTRUCTIONS:  Talk to your surgeon for instructions if you should stop your aspirin, blood thinner, or diabetes medicines.  DO NOT take any multivitamins or over the counter supplements for 7-10 days before surgery.  If not being admitted, you must have an adult immediately available to drive you home after surgery. We also highly recommend you have someone stay with  you for the entire day and night of your surgery.  For children having surgery, a parent or legal guardian must accompany them to the surgery center. If this is not possible, please call [phone] to make additional arrangements.  For adults who are unable to consent or make medical decisions for themselves, a legal guardian or Power of  must accompany them to the surgery center. If this is not possible, please call [phone] to make additional arrangements.  Wear comfortable, loose fitting clothing.  All jewelry and piercings must be removed. If you are unable to remove an item or have a dermal piercing, please be sure to tell the nurse when you arrive for surgery.  Nail polish and make-up must be removed.  Avoid smoking or consuming alcohol for 24 hours before surgery.  To help prevent infection, please take a shower/bath and wash your hair the night before and/or morning of surgery (or follow other specific bathing instructions provided).    Preoperative Fasting Guidelines    Why must I stop eating and drinking near surgery time?  With sedation, food or liquid in your stomach can enter your lungs causing serious complications  Increases nausea and vomiting    When do I need to stop eating and drinking before my surgery?  Do not eat any solid food after midnight the night before your surgery/procedure.  You may have up to TEN ounces of clear liquid until TWO hours before your instructed arrival time to the hospital.  This includes water, black tea/coffee, (no milk or cream) apple juice, and electrolyte drinks (Gatorade).   You may chew gum until TWO hours before your surgery/procedure    If you have any questions or concerns, please call Pre-Admission Testing at [phone].     Home Preoperative Antibacterial Shower with Chlorhexidine gluconate (CHG)     What is a home preoperative antibacterial shower?  This shower is a way of cleaning the skin with a germ killing solution before surgery. The  solution contains chlorhexidine gluconate, commonly known as CHG. CHG is a skin cleanser with germ killing ability. Let your doctor know if you are allergic to chlorhexidine.    Why do I need to take a preoperative antibacterial shower?  Skin is not sterile. It is best to try to make your skin as free of germs as possible before surgery. Proper cleansing with a germ killing soap before surgery can lower the number of germs on your skin. This helps to reduce the risk of infection at the surgical site. Following the instructions listed below will help you prepare your skin for surgery.    How do I use the solution?  Begin using your CHG soap the night before and again the morning of your procedure.   Do not shave the day before or day of surgery.  Remove all jewelry until after surgery. Take off rings and take out all body-piercing jewelry.  Wash your face and hair with normal soap and shampoo before you use the CHG soap.  Apply the CHG solution to a clean wet washcloth. Move away from the water to avoid premature rinsing of the CHG soap as you are applying. Firmly lather your entire body from the neck down. Do not use CHG on your face, eyes, ears, or genitals.   Pay special attention to the area where your incisions will be located.  Do not scrub your skin too hard.  It is important to allow the CHG soap to sit on your skin for 3-5 minutes.  Rinse the solution off your body completely. Do not wash with your normal soap after the CHG soap solution.  Pat yourself dry with a clean, soft towel.  Do not apply powders, lotions or deodorants as these might block how the CHG soap works.   Dress in clean clothing.  Be sure to sleep with clean, freshly laundered sheets.  Be aware that CHG can cause stains on fabric. Rinse your washcloth and other linens that have contact with CHG completely. Use only non-chlorine detergents to launder the items used.

## 2024-03-20 ENCOUNTER — APPOINTMENT (OUTPATIENT)
Dept: PREADMISSION TESTING | Facility: HOSPITAL | Age: 74
End: 2024-03-20
Payer: MEDICARE

## 2024-03-20 ENCOUNTER — OFFICE VISIT (OUTPATIENT)
Dept: CARDIOLOGY | Facility: CLINIC | Age: 74
End: 2024-03-20
Payer: MEDICARE

## 2024-03-20 VITALS
HEART RATE: 58 BPM | WEIGHT: 143 LBS | OXYGEN SATURATION: 97 % | DIASTOLIC BLOOD PRESSURE: 72 MMHG | BODY MASS INDEX: 22.44 KG/M2 | RESPIRATION RATE: 18 BRPM | HEIGHT: 67 IN | SYSTOLIC BLOOD PRESSURE: 134 MMHG

## 2024-03-20 DIAGNOSIS — Z01.818 PRE-OPERATIVE CLEARANCE: ICD-10-CM

## 2024-03-20 DIAGNOSIS — R94.31 ABNORMAL EKG: Primary | ICD-10-CM

## 2024-03-20 PROCEDURE — 99204 OFFICE O/P NEW MOD 45 MIN: CPT | Performed by: NURSE PRACTITIONER

## 2024-03-20 PROCEDURE — 93000 ELECTROCARDIOGRAM COMPLETE: CPT | Performed by: NURSE PRACTITIONER

## 2024-03-20 PROCEDURE — 1160F RVW MEDS BY RX/DR IN RCRD: CPT | Performed by: NURSE PRACTITIONER

## 2024-03-20 PROCEDURE — 1159F MED LIST DOCD IN RCRD: CPT | Performed by: NURSE PRACTITIONER

## 2024-03-20 PROCEDURE — 1036F TOBACCO NON-USER: CPT | Performed by: NURSE PRACTITIONER

## 2024-03-20 NOTE — PATIENT INSTRUCTIONS
- exercise stress test with echocardiogram for abnormal EKG  - virtual follow up after to review results    It was my pleasure to meet you. I look forward to being your cardiac Nurse Practitioner. I am a huge believer in communicating with my patients. Please contact me at any time, if anything is not clear to you regarding anything we have discussed, or if new questions occur to you.

## 2024-03-20 NOTE — PROGRESS NOTES
Name : Susannah Quintanilla   : 1950   MRN : [id number]   ENC Date : 2024    CC:   NPV- Abnormal ECG and Pre-op Clearance     HPI:    Susannah Quintanilla is a 74 y.o. female with PMHx only sig for Hypothyroidism on TRT & GERD who presents today for preoperative operative clearance to undergo hernia repair after having an abnormal EKG.    Denies any chest pain, pressure, SOB/SUMMERS, PND, orthopnea, LE edema, palpitations, lightheadedness, dizziness, or syncope. No routine exercise, but is walking 30-45 mins 3-4x a week, sometimes leisurely vs brisk walk with no associated cardiac symptoms. But mentions If she walks for a long time she will get SOB. Watches her grandkids ages 1 & 4.     No prior cardiac work up. Non smoker. No HTN, HLD nor DM. However, significant family h/o of CVD. 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    Outpatient Medications:  Current Outpatient Medications   Medication Instructions    acetaminophen (TYLENOL 8 HOUR) 650 mg, oral, Every 8 hours PRN, Do not crush, chew, or split.    albuterol 90 mcg/actuation inhaler 2 puffs, inhalation, Every 4 hours PRN    calcium carbonate-vitamin D3 500 mg-3.125 mcg (125 unit) tablet tablet 2 tablets, oral, Once daily (morning) M-F (5 days a week)    cetirizine (ZYRTEC) 10 mg, oral, Daily PRN    levothyroxine (SYNTHROID, LEVOXYL) 88 mcg, oral, Daily    omeprazole (PRILOSEC) 40 mg, oral, As needed     Last Recorded Vitals:  Vitals:    24 0902  "  BP: 134/72   BP Location: Left arm   Patient Position: Sitting   Pulse: 58   Resp: 18   SpO2: 97%   Weight: 64.9 kg (143 lb)   Height: 1.702 m (5' 7\")     Physical Exam:  On exam Ms. Quintanilla appears her stated age, is alert and oriented x3, and in no acute distress. Her sclera are anicteric and her oropharynx has moist mucous membranes. Her neck is supple and without thyromegaly. The JVP is ~5 cm of water above the right atrium. Her cardiac exam has regular rhythm, normal S1, S2. No S3/4. There are no murmurs. Her lungs are clear to auscultation bilaterally and there is no dullness to percussion. Her abdomen is soft, nontender with normoactive bowel sounds. There is no HJR. The extremities are warm and without edema. The skin is dry. There is no rash present. The distal pulses are 2-3+ in all four extremities. Her mood and affect are appropriate for todays encounter.      Last Labs:  CBC -  Lab Results   Component Value Date    WBC 5.1 03/14/2024    HGB 14.1 03/14/2024    HCT 43.9 03/14/2024    MCV 90 03/14/2024     03/14/2024       CMP -  Lab Results   Component Value Date    CALCIUM 9.2 03/14/2024    PROT 6.6 12/18/2023    ALBUMIN 4.1 12/18/2023    AST 14 12/18/2023    ALT 10 12/18/2023    ALKPHOS 74 12/18/2023    BILITOT 0.7 12/18/2023       LIPID PANEL -   Lab Results   Component Value Date    CHOL 178 12/18/2023    TRIG 89 12/18/2023    HDL 75.4 12/18/2023    CHHDL 2.4 12/18/2023    LDLF 108 (H) 12/16/2022    VLDL 18 12/18/2023    NHDL 103 12/18/2023       RENAL FUNCTION PANEL -   Lab Results   Component Value Date    GLUCOSE 112 (H) 03/14/2024     03/14/2024    K 4.3 03/14/2024     03/14/2024    CO2 30 03/14/2024    ANIONGAP 8 (L) 03/14/2024    BUN 12 03/14/2024    CREATININE 0.61 03/14/2024    CALCIUM 9.2 03/14/2024    ALBUMIN 4.1 12/18/2023        No results found for: \"BNP\", \"HGBA1C\"  I have reviewed the above labs & diagnostics    Assessment/Plan:  Abnormal EKG  Preoperative " Clearance    Further CV evaluation needed. Given EKG findings will get a stress echocardiogram. Follow up after to review results      Tracy M Schwab, APRN-CNP

## 2024-03-25 PROBLEM — R92.8 ABNORMAL MAMMOGRAM: Status: ACTIVE | Noted: 2023-04-20

## 2024-03-25 PROBLEM — A04.72 CLOSTRIDIUM DIFFICILE COLITIS: Status: ACTIVE | Noted: 2024-03-25

## 2024-03-25 PROBLEM — K22.2 ESOPHAGEAL STRICTURE: Status: ACTIVE | Noted: 2024-03-25

## 2024-03-25 PROBLEM — N64.89 BREAST ASYMMETRY: Status: ACTIVE | Noted: 2023-04-20

## 2024-03-25 PROBLEM — H66.92 LEFT OTITIS MEDIA: Status: ACTIVE | Noted: 2024-03-25

## 2024-03-28 ENCOUNTER — HOSPITAL ENCOUNTER (OUTPATIENT)
Dept: CARDIOLOGY | Facility: HOSPITAL | Age: 74
Discharge: HOME | End: 2024-03-28
Payer: MEDICARE

## 2024-03-28 DIAGNOSIS — R94.31 ABNORMAL EKG: ICD-10-CM

## 2024-03-28 PROCEDURE — 93016 CV STRESS TEST SUPVJ ONLY: CPT | Performed by: INTERNAL MEDICINE

## 2024-03-28 PROCEDURE — 93018 CV STRESS TEST I&R ONLY: CPT | Performed by: INTERNAL MEDICINE

## 2024-03-28 PROCEDURE — 93017 CV STRESS TEST TRACING ONLY: CPT

## 2024-03-28 PROCEDURE — 93350 STRESS TTE ONLY: CPT | Performed by: INTERNAL MEDICINE

## 2024-03-29 RX ORDER — ACETAMINOPHEN AND CODEINE PHOSPHATE 300; 30 MG/1; MG/1
1 TABLET ORAL EVERY 6 HOURS PRN
Qty: 15 TABLET | Refills: 0 | Status: SHIPPED | OUTPATIENT
Start: 2024-03-29

## 2024-03-29 NOTE — H&P
Susannah Quintanilla   [id number]   1950         Chief Complaint/        Umbilical hernia          HPI/    74-year-old female presents for elective repair of an umbilical hernia.    Patient is status post a laparoscopic procedure via the umbilicus in the past.  This may be a port site recurrent hernia    Past Medical History:   Diagnosis Date    GERD (gastroesophageal reflux disease)     Hypothyroidism     Umbilical hernia         Seasonal allergies    Hypothyroid    Denies hypertension, diabetes, coronary artery disease    Social History     Socioeconomic History    Marital status:      Spouse name: Not on file    Number of children: Not on file    Years of education: Not on file    Highest education level: Not on file   Occupational History    Not on file   Tobacco Use    Smoking status: Never    Smokeless tobacco: Never   Vaping Use    Vaping Use: Never used   Substance and Sexual Activity    Alcohol use: Yes     Comment: occasional    Drug use: Never    Sexual activity: Not Currently   Other Topics Concern    Not on file   Social History Narrative    Not on file     Social Determinants of Health     Financial Resource Strain: Not on file   Food Insecurity: Not on file   Transportation Needs: Not on file   Physical Activity: Not on file   Stress: Not on file   Social Connections: Not on file   Intimate Partner Violence: Not on file   Housing Stability: Not on file          Family History   Problem Relation Name Age of Onset    Breast cancer Mother      Coronary artery disease Father      Hypertension Brother     Non-smoker     Review of Systems   All other systems reviewed and are negative.       No current facility-administered medications for this encounter.    Current Outpatient Medications:     acetaminophen (Tylenol 8 HOUR) 650 mg ER tablet, Take 1 tablet (650 mg) by mouth every 8 hours if needed for mild pain (1 - 3). Do not crush, chew, or split., Disp: , Rfl:     albuterol 90 mcg/actuation inhaler,  "Inhale 2 puffs every 4 hours if needed for wheezing., Disp: 18 g, Rfl: 3    calcium carbonate-vitamin D3 500 mg-3.125 mcg (125 unit) tablet tablet, Take 2 tablets by mouth once daily (M-F)., Disp: , Rfl:     cetirizine (ZyrTEC) 10 mg tablet, Take 1 tablet (10 mg) by mouth once daily as needed for allergies., Disp: , Rfl:     levothyroxine (Synthroid, Levoxyl) 88 mcg tablet, TAKE 1 TABLET BY MOUTH EVERY DAY, Disp: 90 tablet, Rfl: 3    omeprazole (PriLOSEC) 40 mg DR capsule, Take 1 capsule (40 mg) by mouth if needed., Disp: , Rfl:        no      Xaralto  no      Eliquis  no      Coumadin  no      Plavix        No Known Allergies     ECG 12 lead (Clinic Performed)  Sinus Rhythm, 1AVE with inverted T waves in Anterior & septal leads       I have reviewed the images and the reports      Laboratory data:   CBC:   No results found for: \"WBC\", \"RBC\", \"HGB\", \"HCT\", \"PLT\"]   [facility] Labs:   No results found for: \"GLUF\", \"NA\", \"K\", \"CL\", \"CO2\", \"BUN\", \"CREATININE\", \"CALCIUM\", \"PROT\", \"ALBUMIN\", \"BILITOT\", \"BILIDIR\", \"ALKPHOS\", \"AST\", \"ALT\"       I have reviewed the above laboratory studies      There were no vitals taken for this visit.       Physical Exam  Constitutional:       Appearance: Normal appearance.   HENT:      Head: Normocephalic and atraumatic.   Cardiovascular:      Rate and Rhythm: Normal rate and regular rhythm.   Pulmonary:      Effort: Pulmonary effort is normal.      Breath sounds: Normal breath sounds.   Abdominal:      Comments: Hernia and umbilicus noted    Scar consistent with laparoscopic surgery also noted.  I cannot completely reduce this   Musculoskeletal:         General: Normal range of motion.      Cervical back: Normal range of motion.   Skin:     General: Skin is warm.   Neurological:      General: No focal deficit present.      Mental Status: She is alert and oriented to person, place, and time.                  Assessment/    Incisional hernia at laparoscopic port site    Plan/    Will repair " under general anesthesia and open fashion using mesh.    The risk benefits indications for this reviewed with the patient.  The potential bleeding infection MI PE death etc. is reviewed.  The anticipated convalescence is reviewed.  Use of mesh or prosthetic materials was reviewed.  All questions were answered and consent is obtained

## 2024-04-02 ENCOUNTER — TELEMEDICINE (OUTPATIENT)
Dept: CARDIOLOGY | Facility: CLINIC | Age: 74
End: 2024-04-02
Payer: MEDICARE

## 2024-04-02 DIAGNOSIS — R94.31 ABNORMAL EKG: Primary | ICD-10-CM

## 2024-04-02 PROCEDURE — 1036F TOBACCO NON-USER: CPT | Performed by: NURSE PRACTITIONER

## 2024-04-02 PROCEDURE — 99213 OFFICE O/P EST LOW 20 MIN: CPT | Performed by: NURSE PRACTITIONER

## 2024-04-02 PROCEDURE — 1159F MED LIST DOCD IN RCRD: CPT | Performed by: NURSE PRACTITIONER

## 2024-04-02 PROCEDURE — 1160F RVW MEDS BY RX/DR IN RCRD: CPT | Performed by: NURSE PRACTITIONER

## 2024-04-02 NOTE — PROGRESS NOTES
Name : Susannah Quintanilla   : 1950   MRN : [id number]   ENC Date : 2024    CC:   NPV- follow up testing for surgical clearance     HPI:    Susannah Quintanilla is a 74 y.o. female with PMHx only sig for Hypothyroidism on TRT & GERD who presents virtually today to review stress echo results done for preoperative operative clearance to undergo hernia repair after having an abnormal EKG.    Previous Encounter:  Denies any chest pain, pressure, SOB/SUMMERS, PND, orthopnea, LE edema, palpitations, lightheadedness, dizziness, or syncope. No routine exercise, but is walking 30-45 mins 3-4x a week, sometimes leisurely vs brisk walk with no associated cardiac symptoms. But mentions If she walks for a long time she will get SOB. Watches her grandkids ages 1 & 4.     No prior cardiac work up. Non smoker. No HTN, HLD nor DM. However, significant family h/o of CVD. Father  age 48 from CAD/HF    CV Diagnostics:  Stress echocardiogram 3/28/24:   1. Average functional capacity for age ( 5.8 METS).   2. Normal BP response to stress.   3. Rare PVCs during stress.   4. No chest pain during stres or recovery.   5. Baseline EF is 60-65% without regional wall motion abnormalities.   6. Peak EF is 65-70% without regional wall motion abnormalities.   7. Adequate level of stress achieved.   8. During stress, the patient developed a nonspecific intraventricular conduction delay, associated with inferolateral downsloping ST depressions, 1-2 mm.    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    Outpatient Medications:  Current Outpatient Medications   Medication Instructions    acetaminophen (TYLENOL 8 HOUR) 650 mg, oral, Every 8 hours PRN, Do not crush, chew, or split.    acetaminophen-codeine (Tylenol w/ Codeine #3) 300-30 mg tablet 1 tablet, oral, Every 6 hours PRN    albuterol 90 mcg/actuation inhaler 2 puffs, inhalation, Every 4 hours PRN    calcium carbonate-vitamin D3 500 mg-3.125 mcg (125 unit) tablet tablet 2 tablets, oral, Once daily (morning) M-F (5 days a week)    cetirizine (ZYRTEC) 10 mg, oral, Daily PRN    levothyroxine (SYNTHROID, LEVOXYL) 88 mcg, oral, Daily    omeprazole (PRILOSEC) 40 mg, oral, As needed     Last Recorded Vitals:  There were no vitals filed for this visit.    Physical Exam:  On exam Ms. Quintanilla appears her stated age, is alert and oriented x3, and in no acute distress. Her sclera are anicteric and her oropharynx has moist mucous membranes. Her neck is supple and without thyromegaly. The JVP is ~5 cm of water above the right atrium. Her cardiac exam has regular rhythm, normal S1, S2. No S3/4. There are no murmurs. Her lungs are clear to auscultation bilaterally and there is no dullness to percussion. Her abdomen is soft, nontender with normoactive bowel sounds. There is no HJR. The extremities are warm and without edema. The skin is dry. There is no rash present. The distal pulses are 2-3+ in all four extremities. Her mood and affect are appropriate for todays encounter.      Last Labs:  CBC -  Lab Results   Component Value Date    WBC 5.1 03/14/2024    HGB 14.1 03/14/2024    HCT 43.9 03/14/2024    MCV 90 03/14/2024     03/14/2024       CMP -  Lab Results   Component Value Date    CALCIUM 9.2 03/14/2024    PROT 6.6 12/18/2023    ALBUMIN 4.1 12/18/2023    AST 14 12/18/2023    ALT 10 12/18/2023    ALKPHOS 74 12/18/2023    BILITOT 0.7 12/18/2023       LIPID PANEL -   Lab Results   Component Value Date    CHOL 178 12/18/2023    TRIG 89 12/18/2023    HDL  "75.4 12/18/2023    CHHDL 2.4 12/18/2023    LDLF 108 (H) 12/16/2022    VLDL 18 12/18/2023    NHDL 103 12/18/2023       RENAL FUNCTION PANEL -   Lab Results   Component Value Date    GLUCOSE 112 (H) 03/14/2024     03/14/2024    K 4.3 03/14/2024     03/14/2024    CO2 30 03/14/2024    ANIONGAP 8 (L) 03/14/2024    BUN 12 03/14/2024    CREATININE 0.61 03/14/2024    CALCIUM 9.2 03/14/2024    ALBUMIN 4.1 12/18/2023        No results found for: \"BNP\", \"HGBA1C\"  I have reviewed the above labs & diagnostics    Assessment/Plan:  Abnormal EKG  Preoperative Clearance    Stress echocardiogram 3/28/24: inconclusive stress ECG, but normal stress echocardiogram.    Based on the Revised Cardiac Risk Index, Susannah Quintanilla is a Class I risk with 3.9% 30 day risk of death, MI, or cardiac arrest. Susannah Quintanilla is an acceptable cardiac risk & is cleared for surgery       Tracy M Schwab, APRN-CNP  "

## 2024-04-03 ENCOUNTER — ANESTHESIA EVENT (OUTPATIENT)
Dept: OPERATING ROOM | Facility: HOSPITAL | Age: 74
End: 2024-04-03
Payer: MEDICARE

## 2024-04-03 ENCOUNTER — ANESTHESIA (OUTPATIENT)
Dept: OPERATING ROOM | Facility: HOSPITAL | Age: 74
End: 2024-04-03
Payer: MEDICARE

## 2024-04-03 ENCOUNTER — HOSPITAL ENCOUNTER (OUTPATIENT)
Facility: HOSPITAL | Age: 74
Setting detail: OUTPATIENT SURGERY
Discharge: HOME | End: 2024-04-03
Attending: SURGERY | Admitting: SURGERY
Payer: MEDICARE

## 2024-04-03 VITALS
OXYGEN SATURATION: 98 % | HEIGHT: 67 IN | BODY MASS INDEX: 22.46 KG/M2 | HEART RATE: 64 BPM | RESPIRATION RATE: 18 BRPM | TEMPERATURE: 96.8 F | DIASTOLIC BLOOD PRESSURE: 67 MMHG | SYSTOLIC BLOOD PRESSURE: 155 MMHG | WEIGHT: 143.08 LBS

## 2024-04-03 DIAGNOSIS — K42.9 UMBILICAL HERNIA WITHOUT OBSTRUCTION AND WITHOUT GANGRENE: Primary | ICD-10-CM

## 2024-04-03 PROCEDURE — 2780000003 HC OR 278 NO HCPCS: Performed by: SURGERY

## 2024-04-03 PROCEDURE — 2500000004 HC RX 250 GENERAL PHARMACY W/ HCPCS (ALT 636 FOR OP/ED): Performed by: ANESTHESIOLOGIST ASSISTANT

## 2024-04-03 PROCEDURE — 3600000008 HC OR TIME - EACH INCREMENTAL 1 MINUTE - PROCEDURE LEVEL THREE: Performed by: SURGERY

## 2024-04-03 PROCEDURE — C1781 MESH (IMPLANTABLE): HCPCS | Performed by: SURGERY

## 2024-04-03 PROCEDURE — 7100000010 HC PHASE TWO TIME - EACH INCREMENTAL 1 MINUTE: Performed by: SURGERY

## 2024-04-03 PROCEDURE — 2500000004 HC RX 250 GENERAL PHARMACY W/ HCPCS (ALT 636 FOR OP/ED): Mod: JZ | Performed by: SURGERY

## 2024-04-03 PROCEDURE — 0751T DGTZ GLS MCRSCP SLD LEVEL II: CPT | Mod: TC,STJLAB | Performed by: SURGERY

## 2024-04-03 PROCEDURE — 7100000009 HC PHASE TWO TIME - INITIAL BASE CHARGE: Performed by: SURGERY

## 2024-04-03 PROCEDURE — 2500000004 HC RX 250 GENERAL PHARMACY W/ HCPCS (ALT 636 FOR OP/ED): Performed by: ANESTHESIOLOGY

## 2024-04-03 PROCEDURE — 99100 ANES PT EXTEME AGE<1 YR&>70: CPT | Performed by: ANESTHESIOLOGY

## 2024-04-03 PROCEDURE — A49591 PR RPR AA HERNIA 1ST < 3 CM REDUCIBLE: Performed by: ANESTHESIOLOGIST ASSISTANT

## 2024-04-03 PROCEDURE — 88302 TISSUE EXAM BY PATHOLOGIST: CPT | Performed by: PATHOLOGY

## 2024-04-03 PROCEDURE — 7100000002 HC RECOVERY ROOM TIME - EACH INCREMENTAL 1 MINUTE: Performed by: SURGERY

## 2024-04-03 PROCEDURE — 3700000002 HC GENERAL ANESTHESIA TIME - EACH INCREMENTAL 1 MINUTE: Performed by: SURGERY

## 2024-04-03 PROCEDURE — 96372 THER/PROPH/DIAG INJ SC/IM: CPT | Mod: 59 | Performed by: SURGERY

## 2024-04-03 PROCEDURE — 2500000005 HC RX 250 GENERAL PHARMACY W/O HCPCS: Performed by: ANESTHESIOLOGIST ASSISTANT

## 2024-04-03 PROCEDURE — 49591 RPR AA HRN 1ST < 3 CM RDC: CPT | Performed by: SURGERY

## 2024-04-03 PROCEDURE — 2500000004 HC RX 250 GENERAL PHARMACY W/ HCPCS (ALT 636 FOR OP/ED): Performed by: SURGERY

## 2024-04-03 PROCEDURE — 3600000003 HC OR TIME - INITIAL BASE CHARGE - PROCEDURE LEVEL THREE: Performed by: SURGERY

## 2024-04-03 PROCEDURE — 3700000001 HC GENERAL ANESTHESIA TIME - INITIAL BASE CHARGE: Performed by: SURGERY

## 2024-04-03 PROCEDURE — A49591 PR RPR AA HERNIA 1ST < 3 CM REDUCIBLE: Performed by: ANESTHESIOLOGY

## 2024-04-03 PROCEDURE — 2720000007 HC OR 272 NO HCPCS: Performed by: SURGERY

## 2024-04-03 PROCEDURE — 7100000001 HC RECOVERY ROOM TIME - INITIAL BASE CHARGE: Performed by: SURGERY

## 2024-04-03 DEVICE — BARD VENTRALEX HERNIA PATCH, 4.3 CM (1.7"), SMALL CIRCLE WITH STRAP
Type: IMPLANTABLE DEVICE | Site: UMBILICAL | Status: FUNCTIONAL
Brand: VENTRALEX

## 2024-04-03 RX ORDER — ONDANSETRON HYDROCHLORIDE 2 MG/ML
INJECTION, SOLUTION INTRAVENOUS AS NEEDED
Status: DISCONTINUED | OUTPATIENT
Start: 2024-04-03 | End: 2024-04-03

## 2024-04-03 RX ORDER — CEFAZOLIN SODIUM 2 G/100ML
2 INJECTION, SOLUTION INTRAVENOUS
Status: COMPLETED | OUTPATIENT
Start: 2024-04-03 | End: 2024-04-03

## 2024-04-03 RX ORDER — OXYCODONE HYDROCHLORIDE 10 MG/1
10 TABLET ORAL EVERY 4 HOURS PRN
Status: DISCONTINUED | OUTPATIENT
Start: 2024-04-03 | End: 2024-04-03 | Stop reason: HOSPADM

## 2024-04-03 RX ORDER — ALBUTEROL SULFATE 0.83 MG/ML
2.5 SOLUTION RESPIRATORY (INHALATION) ONCE AS NEEDED
Status: DISCONTINUED | OUTPATIENT
Start: 2024-04-03 | End: 2024-04-03 | Stop reason: HOSPADM

## 2024-04-03 RX ORDER — SODIUM CHLORIDE, SODIUM LACTATE, POTASSIUM CHLORIDE, CALCIUM CHLORIDE 600; 310; 30; 20 MG/100ML; MG/100ML; MG/100ML; MG/100ML
INJECTION, SOLUTION INTRAVENOUS CONTINUOUS PRN
Status: DISCONTINUED | OUTPATIENT
Start: 2024-04-03 | End: 2024-04-03

## 2024-04-03 RX ORDER — ONDANSETRON HYDROCHLORIDE 2 MG/ML
4 INJECTION, SOLUTION INTRAVENOUS ONCE AS NEEDED
Status: COMPLETED | OUTPATIENT
Start: 2024-04-03 | End: 2024-04-03

## 2024-04-03 RX ORDER — IBUPROFEN 400 MG/1
400 TABLET ORAL EVERY 6 HOURS PRN
Status: DISCONTINUED | OUTPATIENT
Start: 2024-04-03 | End: 2024-04-03 | Stop reason: HOSPADM

## 2024-04-03 RX ORDER — HEPARIN SODIUM 5000 [USP'U]/ML
5000 INJECTION, SOLUTION INTRAVENOUS; SUBCUTANEOUS
Status: COMPLETED | OUTPATIENT
Start: 2024-04-03 | End: 2024-04-03

## 2024-04-03 RX ORDER — MIDAZOLAM HYDROCHLORIDE 1 MG/ML
1 INJECTION, SOLUTION INTRAMUSCULAR; INTRAVENOUS ONCE AS NEEDED
Status: DISCONTINUED | OUTPATIENT
Start: 2024-04-03 | End: 2024-04-03 | Stop reason: HOSPADM

## 2024-04-03 RX ORDER — HYDROCODONE BITARTRATE AND ACETAMINOPHEN 5; 325 MG/1; MG/1
1 TABLET ORAL EVERY 4 HOURS PRN
Status: DISCONTINUED | OUTPATIENT
Start: 2024-04-03 | End: 2024-04-03 | Stop reason: HOSPADM

## 2024-04-03 RX ORDER — ROCURONIUM BROMIDE 10 MG/ML
INJECTION, SOLUTION INTRAVENOUS AS NEEDED
Status: DISCONTINUED | OUTPATIENT
Start: 2024-04-03 | End: 2024-04-03

## 2024-04-03 RX ORDER — MIDAZOLAM HYDROCHLORIDE 1 MG/ML
INJECTION, SOLUTION INTRAMUSCULAR; INTRAVENOUS AS NEEDED
Status: DISCONTINUED | OUTPATIENT
Start: 2024-04-03 | End: 2024-04-03

## 2024-04-03 RX ORDER — KETOROLAC TROMETHAMINE 30 MG/ML
INJECTION, SOLUTION INTRAMUSCULAR; INTRAVENOUS AS NEEDED
Status: DISCONTINUED | OUTPATIENT
Start: 2024-04-03 | End: 2024-04-03

## 2024-04-03 RX ORDER — ACETAMINOPHEN 325 MG/1
975 TABLET ORAL ONCE
Status: COMPLETED | OUTPATIENT
Start: 2024-04-03 | End: 2024-04-03

## 2024-04-03 RX ORDER — HYDROMORPHONE HYDROCHLORIDE 1 MG/ML
1 INJECTION, SOLUTION INTRAMUSCULAR; INTRAVENOUS; SUBCUTANEOUS EVERY 5 MIN PRN
Status: DISCONTINUED | OUTPATIENT
Start: 2024-04-03 | End: 2024-04-03 | Stop reason: HOSPADM

## 2024-04-03 RX ORDER — PROPOFOL 10 MG/ML
INJECTION, EMULSION INTRAVENOUS AS NEEDED
Status: DISCONTINUED | OUTPATIENT
Start: 2024-04-03 | End: 2024-04-03

## 2024-04-03 RX ORDER — LIDOCAINE HYDROCHLORIDE 20 MG/ML
INJECTION, SOLUTION EPIDURAL; INFILTRATION; INTRACAUDAL; PERINEURAL AS NEEDED
Status: DISCONTINUED | OUTPATIENT
Start: 2024-04-03 | End: 2024-04-03

## 2024-04-03 RX ORDER — FENTANYL CITRATE 50 UG/ML
INJECTION, SOLUTION INTRAMUSCULAR; INTRAVENOUS AS NEEDED
Status: DISCONTINUED | OUTPATIENT
Start: 2024-04-03 | End: 2024-04-03

## 2024-04-03 RX ORDER — SODIUM CHLORIDE, SODIUM LACTATE, POTASSIUM CHLORIDE, CALCIUM CHLORIDE 600; 310; 30; 20 MG/100ML; MG/100ML; MG/100ML; MG/100ML
100 INJECTION, SOLUTION INTRAVENOUS CONTINUOUS
Status: DISCONTINUED | OUTPATIENT
Start: 2024-04-03 | End: 2024-04-03 | Stop reason: HOSPADM

## 2024-04-03 RX ORDER — HYDRALAZINE HYDROCHLORIDE 20 MG/ML
5 INJECTION INTRAMUSCULAR; INTRAVENOUS EVERY 30 MIN PRN
Status: DISCONTINUED | OUTPATIENT
Start: 2024-04-03 | End: 2024-04-03 | Stop reason: HOSPADM

## 2024-04-03 RX ADMIN — ONDANSETRON 4 MG: 2 INJECTION INTRAMUSCULAR; INTRAVENOUS at 08:33

## 2024-04-03 RX ADMIN — HYDROMORPHONE HYDROCHLORIDE 1 MG: 1 INJECTION, SOLUTION INTRAMUSCULAR; INTRAVENOUS; SUBCUTANEOUS at 08:21

## 2024-04-03 RX ADMIN — DEXAMETHASONE SODIUM PHOSPHATE 4 MG: 4 INJECTION, SOLUTION INTRAMUSCULAR; INTRAVENOUS at 07:36

## 2024-04-03 RX ADMIN — HEPARIN SODIUM 5000 UNITS: 5000 INJECTION INTRAVENOUS; SUBCUTANEOUS at 06:20

## 2024-04-03 RX ADMIN — ACETAMINOPHEN 975 MG: 325 TABLET ORAL at 10:12

## 2024-04-03 RX ADMIN — KETOROLAC TROMETHAMINE 15 MG: 30 INJECTION, SOLUTION INTRAMUSCULAR; INTRAVENOUS at 08:08

## 2024-04-03 RX ADMIN — FENTANYL CITRATE 25 MCG: 50 INJECTION, SOLUTION INTRAMUSCULAR; INTRAVENOUS at 07:27

## 2024-04-03 RX ADMIN — CEFAZOLIN SODIUM 2 G: 2 INJECTION, SOLUTION INTRAVENOUS at 07:35

## 2024-04-03 RX ADMIN — LIDOCAINE HYDROCHLORIDE 50 MG: 20 INJECTION, SOLUTION EPIDURAL; INFILTRATION; INTRACAUDAL; PERINEURAL at 07:28

## 2024-04-03 RX ADMIN — MIDAZOLAM 1 MG: 1 INJECTION INTRAMUSCULAR; INTRAVENOUS at 07:19

## 2024-04-03 RX ADMIN — SUGAMMADEX 200 MG: 100 INJECTION, SOLUTION INTRAVENOUS at 08:01

## 2024-04-03 RX ADMIN — PROPOFOL 130 MG: 10 INJECTION, EMULSION INTRAVENOUS at 07:28

## 2024-04-03 RX ADMIN — SODIUM CHLORIDE, POTASSIUM CHLORIDE, SODIUM LACTATE AND CALCIUM CHLORIDE: 600; 310; 30; 20 INJECTION, SOLUTION INTRAVENOUS at 08:01

## 2024-04-03 RX ADMIN — HYDROMORPHONE HYDROCHLORIDE 0.5 MG: 1 INJECTION, SOLUTION INTRAMUSCULAR; INTRAVENOUS; SUBCUTANEOUS at 08:33

## 2024-04-03 RX ADMIN — SODIUM CHLORIDE, POTASSIUM CHLORIDE, SODIUM LACTATE AND CALCIUM CHLORIDE: 600; 310; 30; 20 INJECTION, SOLUTION INTRAVENOUS at 07:14

## 2024-04-03 RX ADMIN — ROCURONIUM BROMIDE 40 MG: 10 INJECTION INTRAVENOUS at 07:28

## 2024-04-03 RX ADMIN — FENTANYL CITRATE 50 MCG: 50 INJECTION, SOLUTION INTRAMUSCULAR; INTRAVENOUS at 07:59

## 2024-04-03 RX ADMIN — FENTANYL CITRATE 25 MCG: 50 INJECTION, SOLUTION INTRAMUSCULAR; INTRAVENOUS at 07:50

## 2024-04-03 RX ADMIN — ONDANSETRON 4 MG: 2 INJECTION INTRAMUSCULAR; INTRAVENOUS at 07:55

## 2024-04-03 SDOH — HEALTH STABILITY: MENTAL HEALTH: CURRENT SMOKER: 0

## 2024-04-03 ASSESSMENT — PAIN SCALES - GENERAL
PAINLEVEL_OUTOF10: 8
PAINLEVEL_OUTOF10: 8
PAINLEVEL_OUTOF10: 5 - MODERATE PAIN
PAINLEVEL_OUTOF10: 2
PAINLEVEL_OUTOF10: 6
PAINLEVEL_OUTOF10: 5 - MODERATE PAIN
PAINLEVEL_OUTOF10: 4
PAINLEVEL_OUTOF10: 4
PAINLEVEL_OUTOF10: 5 - MODERATE PAIN
PAINLEVEL_OUTOF10: 6

## 2024-04-03 ASSESSMENT — PAIN - FUNCTIONAL ASSESSMENT
PAIN_FUNCTIONAL_ASSESSMENT: 0-10
PAIN_FUNCTIONAL_ASSESSMENT: UNABLE TO SELF-REPORT
PAIN_FUNCTIONAL_ASSESSMENT: 0-10

## 2024-04-03 ASSESSMENT — COLUMBIA-SUICIDE SEVERITY RATING SCALE - C-SSRS
6. HAVE YOU EVER DONE ANYTHING, STARTED TO DO ANYTHING, OR PREPARED TO DO ANYTHING TO END YOUR LIFE?: NO
1. IN THE PAST MONTH, HAVE YOU WISHED YOU WERE DEAD OR WISHED YOU COULD GO TO SLEEP AND NOT WAKE UP?: NO
2. HAVE YOU ACTUALLY HAD ANY THOUGHTS OF KILLING YOURSELF?: NO

## 2024-04-03 ASSESSMENT — PAIN DESCRIPTION - LOCATION
LOCATION: ABDOMEN
LOCATION: ABDOMEN

## 2024-04-03 ASSESSMENT — PAIN DESCRIPTION - DESCRIPTORS
DESCRIPTORS: ACHING;DULL
DESCRIPTORS: SHARP;SORE
DESCRIPTORS: SHARP;SORE

## 2024-04-03 NOTE — ANESTHESIA PREPROCEDURE EVALUATION
Patient: Susannah Quintanilla    Procedure Information       Date/Time: 04/03/24 0730    Procedure: Repair of incisional hernia at laparoscopic port site of umbilicus    Location: [facility] OR 06 / [facility]    Surgeons: Lloyd Ulrich MD            Relevant Problems   Cardiac   (+) Abnormal EKG   (+) Hyperlipidemia      GI   (+) Esophageal stricture      Endocrine   (+) Hypothyroidism      ID   (+) Clostridium difficile colitis       Clinical information reviewed:   Tobacco  Allergies  Meds  Problems  Med Hx  Surg Hx  OB Status    Fam Hx  Soc Hx        NPO Detail:  NPO/Void Status  Carbohydrate Drink Given Prior to Surgery? : N  Date of Last Liquid: 04/03/24  Time of Last Liquid: 0400  Date of Last Solid: 04/02/24  Time of Last Solid: 1830  Last Intake Type: Clear fluids  Time of Last Void: 0545         Physical Exam    Airway  Mallampati: I  TM distance: >3 FB  Neck ROM: full     Cardiovascular - normal exam  Rhythm: regular  Rate: normal     Dental - normal exam     Pulmonary - normal exam  Breath sounds clear to auscultation     Abdominal   Abdomen: soft  Bowel sounds: normal           Anesthesia Plan    History of general anesthesia?: yes  History of complications of general anesthesia?: no    ASA 2     general     The patient is not a current smoker.  Patient was previously instructed to abstain from smoking on day of procedure.  Patient did not smoke on day of procedure.  Education provided regarding risk of obstructive sleep apnea.  intravenous induction   Postoperative administration of opioids is intended.  Anesthetic plan and risks discussed with patient.  Use of blood products discussed with patient who.    Plan discussed with CAA.

## 2024-04-03 NOTE — OP NOTE
Repair of incisional hernia at laparoscopic port site of umbilicus Operative Note     Date: 4/3/2024  OR Location: [facility] OR    Name: Susannah Quintanilla, : 1950, Age: 74 y.o., MRN: [id number], Sex: female    Diagnosis  Pre-op Diagnosis     * Umbilical hernia without obstruction and without gangrene [K42.9] Post-op Diagnosis     * Umbilical hernia without obstruction and without gangrene [K42.9]     Procedures  Repair of incisional hernia at laparoscopic port site of umbilicus  60219 - [place] RPR AA HERNIA 1ST < 3 CM REDUCIBLE      Surgeons      * Lloyd Ulrich - Primary    Resident/Fellow/Other Assistant:  Surgeon(s) and Role:    Procedure Summary  Anesthesia: General  ASA: II  Anesthesia Staff: Anesthesiologist: Love Garza MD  C-AA: MARITZA Boyle  Estimated Blood Loss: Minimal mL  Intra-op Medications:   Administrations occurring from 0730 to 0915 on 24:   Medication Name Total Dose   ceFAZolin in dextrose (iso-os) (Ancef) IVPB 2 g 2 g              Anesthesia Record               Intraprocedure I/O Totals       None           Specimen:   ID Type Source Tests Collected by Time   1 : HERNIA TISSUE Tissue HERNIA SAC SURGICAL PATHOLOGY EXAM Lloyd Ulrich MD 4/3/2024 0754        Staff:   Circulator: Rajat Romero RN; Nahomi Gilbert RN  Scrub Person: Kristyn Paul RN; Lny Grant         Drains and/or Catheters: * None in log *    Tourniquet Times:         Implants:  Implants       Type Name Action Serial No.      Surgical Mesh Sling Implant PATCH, HERNIA, BARD VENTRALEX/W STRAP, SMALL [place], 4.3CM X 4.3CM - [id number] Implanted          Patient is a 74-year-old female with a chronically incarcerated hernia at the umbilicus from prior laparoscopic port site.  She presents now for elective repair.  Risk benefits indications for surgery reviewed with her.  The potential bleeding infection MI PE death etc. reviewed.  The anticipated convalescence is reviewed.  Use of mesh and prosthetic materials is  reviewed.  All questions were answered and consent is obtained.    Patient is taken to the operating room placed on the table in supine position.  General esthesia obtained.  Abdomen prepped and draped in a sterile fashion.  Timeout procedures were followed.  Antibiotics were given.  DVT prophylaxis obtained using subcutaneous heparin placement of external meta compression stockings.    Transverse incision was made the inferior aspect of the umbilicus and dissection carried out.  Hernial mass is encountered and  from the skin subcutaneous tissues in the periumbilical skin    Sac has some omentum within it.  Again the sac is opened and the omentum reduced.  Sac is excised    Sacs made hemostatic with the cautery and suture ligature    A small Ventralex patch was brought on the field and positioned in the abdomen with the PTFE aspect of the mesh directed against intra-abdominal contents and the polypropylene aspect of the mesh directedness posterior aspect of the abdominal wall.  The fascial defect is now closed transversely using multiple interrupted 0 Prolene sutures incorporating the mesh into the closure    The repair was inspected.  Hemostasis was good.  Counts reported as correct.  Subcutaneous tissues were irrigated made hemostatic.  Subcutaneous tissues closed with 3-0 Vicryl.  Skin closed with 4-0 Vicryl.    Patient tolerated procedure well    Lloyd Ulrich  Phone Number: [phone]

## 2024-04-03 NOTE — ANESTHESIA PROCEDURE NOTES
Airway  Date/Time: 4/3/2024 7:31 AM  Urgency: elective    Airway not difficult    Staffing  Performed: MARITZA   Authorized by: Love Garza MD    Performed by: MARITZA Boyle  Patient location during procedure: OR    Indications and Patient Condition  Indications for airway management: anesthesia  Spontaneous Ventilation: absent  Sedation level: deep  Preoxygenated: yes  Patient position: sniffing  MILS not maintained throughout  Mask difficulty assessment: 1 - vent by mask  Planned trial extubation    Final Airway Details  Final airway type: endotracheal airway      Successful airway: ETT  Cuffed: yes   Successful intubation technique: direct laryngoscopy  Facilitating devices/methods: intubating stylet  Endotracheal tube insertion site: oral  Blade: Kavitha  Blade size: #3  ETT size (mm): 7.0  Cormack-Lehane Classification: grade IIb - view of arytenoids or posterior of glottis only  Placement verified by: chest auscultation and capnometry   Cuff volume (mL): 6  Measured from: lips  ETT to lips (cm): 21  Number of attempts at approach: 1  Number of other approaches attempted: 0    Additional Comments  Atraumatic x1 attempt

## 2024-04-03 NOTE — BRIEF OP NOTE
Date: 4/3/2024  OR Location: [facility] OR    Name: Susannah Quintanilla, : 1950, Age: 74 y.o., MRN: [id number], Sex: female    Diagnosis  Pre-op Diagnosis     * Umbilical hernia without obstruction and without gangrene [K42.9] Post-op Diagnosis     * Umbilical hernia without obstruction and without gangrene [K42.9]     Procedures  Repair of incisional hernia at laparoscopic port site of umbilicus  37706 - [place] RPR AA HERNIA 1ST < 3 CM REDUCIBLE      Surgeons      * Lloyd Ulrich - Primary    Resident/Fellow/Other Assistant:  Surgeon(s) and Role:    Procedure Summary  Anesthesia: General  ASA: II  Anesthesia Staff: Anesthesiologist: Love Garza MD  C-AA: MARITZA Boyle  Estimated Blood Loss: Minimal mL  Intra-op Medications:   Administrations occurring from 0730 to 0915 on 24:   Medication Name Total Dose   ceFAZolin in dextrose (iso-os) (Ancef) IVPB 2 g 2 g              Anesthesia Record               Intraprocedure I/O Totals       None           Specimen:   ID Type Source Tests Collected by Time   1 : HERNIA TISSUE Tissue HERNIA SAC SURGICAL PATHOLOGY EXAM Lloyd Ulrich MD 4/3/2024 4503        Staff:   Circulator: Rajat Romero RN; Nahomi Gilbert RN  Scrub Person: Kristyn Paul RN; Lyn Grant          Findings: 2 cm defect    Complications:  None; patient tolerated the procedure well.     Disposition: PACU - hemodynamically stable.  Condition: stable  Specimens Collected:   ID Type Source Tests Collected by Time   1 : HERNIA TISSUE Tissue HERNIA SAC SURGICAL PATHOLOGY EXAM Lloyd Ulrich MD 4/3/2024 1645     Attending Attestation: I performed the procedure.    Lloyd Ulrich  Phone Number: [phone]

## 2024-04-03 NOTE — ANESTHESIA POSTPROCEDURE EVALUATION
Patient: Susannah Quintanilla    Procedure Summary       Date: 04/03/24 Room / Location: [facility] OR 06 / [facility] OR    Anesthesia Start: 0719 Anesthesia Stop:     Procedure: Repair of incisional hernia at laparoscopic port site of umbilicus (Abdomen) Diagnosis:       Umbilical hernia without obstruction and without gangrene      (Umbilical hernia without obstruction and without gangrene [K42.9])    Surgeons: Lloyd Ulrich MD Responsible Provider: Love Garza MD    Anesthesia Type: general ASA Status: 2            Anesthesia Type: general    Vitals Value Taken Time   /84 04/03/24 0811   Temp 36 04/03/24 0811   Pulse 69 04/03/24 0811   Resp 15 04/03/24 0811   SpO2 98 04/03/24 0811       Anesthesia Post Evaluation    Patient location during evaluation: PACU  Patient participation: complete - patient participated  Level of consciousness: awake and alert  Pain management: satisfactory to patient  Multimodal analgesia pain management approach  Airway patency: patent  Two or more strategies used to mitigate risk of obstructive sleep apnea  Cardiovascular status: acceptable and hemodynamically stable  Respiratory status: acceptable, face mask, nonlabored ventilation and spontaneous ventilation  Hydration status: euvolemic  Postoperative Nausea and Vomiting: none        No notable events documented.

## 2024-04-06 LAB
ATRIAL RATE: 64 BPM
P AXIS: 72 DEGREES
P OFFSET: 165 MS
P ONSET: 112 MS
PR INTERVAL: 232 MS
Q ONSET: 228 MS
QRS COUNT: 10 BEATS
QRS DURATION: 70 MS
QT INTERVAL: 420 MS
QTC CALCULATION(BAZETT): 433 MS
QTC FREDERICIA: 429 MS
R AXIS: 2 DEGREES
T AXIS: -6 DEGREES
T OFFSET: 438 MS
VENTRICULAR RATE: 64 BPM

## 2024-04-08 LAB
LABORATORY COMMENT REPORT: NORMAL
PATH REPORT.FINAL DX SPEC: NORMAL
PATH REPORT.GROSS SPEC: NORMAL
PATH REPORT.RELEVANT HX SPEC: NORMAL
PATH REPORT.TOTAL CANCER: NORMAL

## 2024-04-16 ENCOUNTER — OFFICE VISIT (OUTPATIENT)
Dept: SURGERY | Facility: CLINIC | Age: 74
End: 2024-04-16
Payer: MEDICARE

## 2024-04-16 VITALS
SYSTOLIC BLOOD PRESSURE: 144 MMHG | RESPIRATION RATE: 16 BRPM | HEART RATE: 68 BPM | OXYGEN SATURATION: 98 % | DIASTOLIC BLOOD PRESSURE: 80 MMHG | TEMPERATURE: 96.5 F

## 2024-04-16 DIAGNOSIS — K42.9 UMBILICAL HERNIA WITHOUT OBSTRUCTION AND WITHOUT GANGRENE: Primary | ICD-10-CM

## 2024-04-16 PROCEDURE — 1159F MED LIST DOCD IN RCRD: CPT | Performed by: SURGERY

## 2024-04-16 PROCEDURE — 99024 POSTOP FOLLOW-UP VISIT: CPT | Performed by: SURGERY

## 2024-04-16 PROCEDURE — 1160F RVW MEDS BY RX/DR IN RCRD: CPT | Performed by: SURGERY

## 2024-04-16 NOTE — PROGRESS NOTES
Chief complaint/    Postop check        HPI/    74-year-old female status post repair of a port site hernia at the umbilicus    Patient returns in follow-up.  She feels good.  Ambulating easily.  GI and  function at baseline    Denies any wound healing problems        Physical exam/    The wound is clean and dry without any redness or drainage        Assessment/    Doing well after repair of hernia        Plan/    Continue to gradually increase level of activity    RTC as needed

## 2024-05-09 ENCOUNTER — OFFICE VISIT (OUTPATIENT)
Dept: PRIMARY CARE | Facility: CLINIC | Age: 74
End: 2024-05-09
Payer: MEDICARE

## 2024-05-09 VITALS
RESPIRATION RATE: 18 BRPM | BODY MASS INDEX: 22.29 KG/M2 | OXYGEN SATURATION: 97 % | SYSTOLIC BLOOD PRESSURE: 142 MMHG | DIASTOLIC BLOOD PRESSURE: 82 MMHG | HEIGHT: 67 IN | TEMPERATURE: 98.3 F | WEIGHT: 142 LBS | HEART RATE: 67 BPM

## 2024-05-09 DIAGNOSIS — B96.89 ACUTE BACTERIAL SINUSITIS: Primary | ICD-10-CM

## 2024-05-09 DIAGNOSIS — J01.90 ACUTE BACTERIAL SINUSITIS: Primary | ICD-10-CM

## 2024-05-09 DIAGNOSIS — B37.31 YEAST VAGINITIS: ICD-10-CM

## 2024-05-09 DIAGNOSIS — Z12.31 ENCOUNTER FOR SCREENING MAMMOGRAM FOR BREAST CANCER: ICD-10-CM

## 2024-05-09 PROCEDURE — 1160F RVW MEDS BY RX/DR IN RCRD: CPT | Performed by: FAMILY MEDICINE

## 2024-05-09 PROCEDURE — 99213 OFFICE O/P EST LOW 20 MIN: CPT | Performed by: FAMILY MEDICINE

## 2024-05-09 PROCEDURE — 1159F MED LIST DOCD IN RCRD: CPT | Performed by: FAMILY MEDICINE

## 2024-05-09 RX ORDER — AZITHROMYCIN 250 MG/1
TABLET, FILM COATED ORAL DAILY
Qty: 6 TABLET | Refills: 0 | Status: SHIPPED | OUTPATIENT
Start: 2024-05-09 | End: 2024-05-13

## 2024-05-09 RX ORDER — FLUCONAZOLE 150 MG/1
150 TABLET ORAL ONCE
Qty: 1 TABLET | Refills: 0 | Status: SHIPPED | OUTPATIENT
Start: 2024-05-09 | End: 2024-05-09

## 2024-05-14 ASSESSMENT — ENCOUNTER SYMPTOMS
SORE THROAT: 0
HEADACHES: 1

## 2024-05-14 NOTE — PROGRESS NOTES
"Subjective     Patient ID: Susannah Quintanilla is a 74 y.o. female who presents for Earache.  Earache   There is pain in both ears. This is a new problem. The current episode started 1 to 4 weeks ago. The problem occurs constantly. The problem has been gradually worsening. There has been no fever. The pain is at a severity of 3/10. Associated symptoms include headaches. Pertinent negatives include no ear discharge or sore throat. She has tried nothing for the symptoms.   Sinusitis  This is a new problem. The current episode started 1 to 4 weeks ago. The problem is unchanged. There has been no fever. The pain is moderate. Associated symptoms include ear pain and headaches. Pertinent negatives include no sore throat. Past treatments include nothing.   Vaginal Itching  The patient's primary symptoms include genital itching. The patient's pertinent negatives include no genital lesions or genital odor. This is a new problem. The current episode started in the past 7 days. The problem occurs constantly. The problem has been unchanged. Associated symptoms include headaches. Pertinent negatives include no sore throat.       Review of Systems   HENT:  Positive for ear pain. Negative for ear discharge and sore throat.    Neurological:  Positive for headaches.       Objective     Vitals:    05/09/24 1606   BP: 142/82   BP Location: Right arm   Patient Position: Sitting   Pulse: 67   Resp: 18   Temp: 36.8 °C (98.3 °F)   TempSrc: Temporal   SpO2: 97%   Weight: 64.4 kg (142 lb)   Height: 1.702 m (5' 7\")        Current Outpatient Medications   Medication Instructions    acetaminophen (TYLENOL 8 HOUR) 650 mg, oral, Every 8 hours PRN, Do not crush, chew, or split.    acetaminophen-codeine (Tylenol w/ Codeine #3) 300-30 mg tablet 1 tablet, oral, Every 6 hours PRN    albuterol 90 mcg/actuation inhaler 2 puffs, inhalation, Every 4 hours PRN    calcium carbonate-vitamin D3 500 mg-3.125 mcg (125 unit) tablet tablet 2 tablets, oral, Once daily " (morning) M-F (5 days a week)    cetirizine (ZYRTEC) 10 mg, oral, Daily PRN    levothyroxine (SYNTHROID, LEVOXYL) 88 mcg, oral, Daily    omeprazole (PRILOSEC) 40 mg, oral, As needed        Physical Exam  Constitutional:       Appearance: Normal appearance.   HENT:      Head: Normocephalic and atraumatic.      Right Ear: Tympanic membrane normal.      Left Ear: Tympanic membrane normal.      Ears:      Comments: EARS SLIGHTLY BULGING, MAXILLARY SINUS PRESSURE     Nose: Nose normal.      Mouth/Throat:      Mouth: Mucous membranes are moist.   Cardiovascular:      Rate and Rhythm: Normal rate and regular rhythm.   Pulmonary:      Effort: Pulmonary effort is normal.      Breath sounds: Normal breath sounds.   Neurological:      Mental Status: She is alert.         Assessment/Plan   Diagnoses and all orders for this visit:  Acute bacterial sinusitis  -     azithromycin (Zithromax) 250 mg tablet; Take 2 tablets (500 mg) by mouth once daily for 1 day, THEN 1 tablet (250 mg) once daily for 4 days. Take 2 tabs (500 mg) by mouth today, than 1 daily for 4 days..  Encounter for screening mammogram for breast cancer  -     BI mammo bilateral screening tomosynthesis; Future  Yeast vaginitis  -     fluconazole (Diflucan) 150 mg tablet; Take 1 tablet (150 mg) by mouth 1 time for 1 dose.

## 2024-05-22 NOTE — PROGRESS NOTES
History of Present Illness   Chief Complaint   Patient presents with    Left Hip - New Patient Visit     X-rays today       History of Present Illness:   The patient is a 74-year-old female presenting today as an established patient with complaints of left hip/thigh pain. She was last seen in clinic on 12/16/22 for an incomplete rotator cuff tear of right shoulder. She states that 10 years ago she was diagnosed with bursitis in her left hip, and given a cortisone injection at that time to which she had no complaints until now. On Monday, she began taking ibuprofen twice a day for pain relief. She has occasional pain radiating down into her knee and calf.     Imaging:  Hip/pelvis: No acute fractures or dislocations.  No arthritic change.     Assessment:   Left hip bursitis    Plan:  We reviewed the role of imaging, physical therapy, injections and the time frame to healing and correlation with outcome.    NSAID: Ibuprofen 800 mg 2-3x daily for one week.  GI side effects and medical risks discussed  Ice: 60 minutes on and off  Exercise home program: Medically directed hip therapy / Handout given. Suggested low impact exercises and foam roller.  Follow-up as needed     Physical Exam:  Well-nourished, well-developed. No acute distress. Alert and oriented x3. Responds appropriately to questioning. Good mood. Normal affect.  Physical Exam  Left Hip:  Normal gait, Negative Trendelenburg sign  Skin healthy and intact  Moderate tenderness over greater trochanter  Full passive motion  No impingement  Negative straight leg raise  Neurovascular exam normal distally     Review of Systems:  GENERAL: Negative for malaise, significant weight loss, fever  MUSCULOSKELETAL: See HPI  NEURO:  Negative     Past Medical History:   Diagnosis Date    GERD (gastroesophageal reflux disease)     Hypothyroidism     Umbilical hernia        Medication Documentation Review Audit       Reviewed by Peri Mendez MA (Medical Assistant) on  05/09/24 at 1607      Medication Order Taking? Sig Documenting Provider Last Dose Status   acetaminophen (Tylenol 8 HOUR) 650 mg ER tablet [id number] Yes Take 1 tablet (650 mg) by mouth every 8 hours if needed for mild pain (1 - 3). Do not crush, chew, or split. Historical Provider, MD Taking Active   acetaminophen-codeine (Tylenol w/ Codeine #3) 300-30 mg tablet [id number] Yes Take 1 tablet by mouth every 6 hours if needed for severe pain (7 - 10). Lloyd Ulrich MD Taking Active   albuterol 90 mcg/actuation inhaler [id number] Yes Inhale 2 puffs every 4 hours if needed for wheezing. Nela Milligan DO Taking Active   calcium carbonate-vitamin D3 500 mg-3.125 mcg (125 unit) tablet tablet [id number] Yes Take 2 tablets by mouth once daily (M-F). Historical Provider, MD Taking Active   cetirizine (ZyrTEC) 10 mg tablet [id number] Yes Take 1 tablet (10 mg) by mouth once daily as needed for allergies. Historical Provider, MD Taking Active   levothyroxine (Synthroid, Levoxyl) 88 mcg tablet [id number] Yes TAKE 1 TABLET BY MOUTH EVERY DAY Nela Milligan DO Taking Active   omeprazole (PriLOSEC) 40 mg DR capsule [id number] Yes Take 1 capsule (40 mg) by mouth if needed. Historical Provider, MD Taking Active                    No Known Allergies    Social History     Socioeconomic History    Marital status:      Spouse name: Not on file    Number of children: Not on file    Years of education: Not on file    Highest education level: Not on file   Occupational History    Not on file   Tobacco Use    Smoking status: Never    Smokeless tobacco: Never   Vaping Use    Vaping status: Never Used   Substance and Sexual Activity    Alcohol use: Yes     Comment: occasional    Drug use: Never    Sexual activity: Not Currently   Other Topics Concern    Not on file   Social History Narrative    Not on file     Social Determinants of Health     Financial Resource Strain: Not on file   Food Insecurity: Not on file   Transportation  Needs: Not on file   Physical Activity: Not on file   Stress: Not on file   Social Connections: Not on file   Intimate Partner Violence: Not on file   Housing Stability: Not on file       Past Surgical History:   Procedure Laterality Date    OTHER SURGICAL HISTORY  11/20/2019    Cataract surgery    OTHER SURGICAL HISTORY  11/20/2019    Cholecystectomy    OTHER SURGICAL HISTORY  11/20/2019    Loop electrosurgical excision procedure       No results found.                          Scribe Attestation  By signing my name below, IKatie Scribe   attest that this documentation has been prepared under the direction and in the presence of Luis Angel Connell MD.

## 2024-05-23 ENCOUNTER — HOSPITAL ENCOUNTER (OUTPATIENT)
Dept: RADIOLOGY | Facility: CLINIC | Age: 74
Discharge: HOME | End: 2024-05-23
Payer: MEDICARE

## 2024-05-23 ENCOUNTER — OFFICE VISIT (OUTPATIENT)
Dept: ORTHOPEDIC SURGERY | Facility: CLINIC | Age: 74
End: 2024-05-23
Payer: MEDICARE

## 2024-05-23 DIAGNOSIS — M25.552 HIP PAIN, LEFT: ICD-10-CM

## 2024-05-23 PROCEDURE — 99213 OFFICE O/P EST LOW 20 MIN: CPT | Performed by: ORTHOPAEDIC SURGERY

## 2024-05-23 PROCEDURE — 73502 X-RAY EXAM HIP UNI 2-3 VIEWS: CPT | Mod: LT

## 2024-05-23 PROCEDURE — 1160F RVW MEDS BY RX/DR IN RCRD: CPT | Performed by: ORTHOPAEDIC SURGERY

## 2024-05-23 PROCEDURE — 73502 X-RAY EXAM HIP UNI 2-3 VIEWS: CPT | Mod: LEFT SIDE | Performed by: RADIOLOGY

## 2024-05-23 PROCEDURE — 99214 OFFICE O/P EST MOD 30 MIN: CPT | Performed by: ORTHOPAEDIC SURGERY

## 2024-05-23 PROCEDURE — 1159F MED LIST DOCD IN RCRD: CPT | Performed by: ORTHOPAEDIC SURGERY

## 2024-05-24 RX ORDER — IBUPROFEN 800 MG/1
800 TABLET ORAL 3 TIMES DAILY
Qty: 90 TABLET | Refills: 0 | Status: SHIPPED | OUTPATIENT
Start: 2024-05-24 | End: 2024-06-23

## 2024-07-01 ENCOUNTER — APPOINTMENT (OUTPATIENT)
Dept: RADIOLOGY | Facility: CLINIC | Age: 74
End: 2024-07-01
Payer: MEDICARE

## 2024-07-05 ENCOUNTER — HOSPITAL ENCOUNTER (OUTPATIENT)
Dept: RADIOLOGY | Facility: CLINIC | Age: 74
Discharge: HOME | End: 2024-07-05
Payer: MEDICARE

## 2024-07-05 VITALS — WEIGHT: 141.98 LBS | HEIGHT: 67 IN | BODY MASS INDEX: 22.28 KG/M2

## 2024-07-05 DIAGNOSIS — Z12.31 ENCOUNTER FOR SCREENING MAMMOGRAM FOR BREAST CANCER: ICD-10-CM

## 2024-07-05 PROCEDURE — 77067 SCR MAMMO BI INCL CAD: CPT

## 2024-11-06 ENCOUNTER — APPOINTMENT (OUTPATIENT)
Dept: PRIMARY CARE | Facility: CLINIC | Age: 74
End: 2024-11-06
Payer: MEDICARE

## 2024-11-07 ENCOUNTER — OFFICE VISIT (OUTPATIENT)
Dept: PRIMARY CARE | Facility: CLINIC | Age: 74
End: 2024-11-07
Payer: MEDICARE

## 2024-11-07 VITALS
HEART RATE: 80 BPM | SYSTOLIC BLOOD PRESSURE: 122 MMHG | RESPIRATION RATE: 16 BRPM | TEMPERATURE: 97.9 F | HEIGHT: 67 IN | OXYGEN SATURATION: 98 % | WEIGHT: 142 LBS | DIASTOLIC BLOOD PRESSURE: 82 MMHG | BODY MASS INDEX: 22.29 KG/M2

## 2024-11-07 DIAGNOSIS — E03.9 ACQUIRED HYPOTHYROIDISM: ICD-10-CM

## 2024-11-07 DIAGNOSIS — L08.82 OMPHALITIS IN ADULT: Primary | ICD-10-CM

## 2024-11-07 DIAGNOSIS — E78.2 MIXED HYPERLIPIDEMIA: ICD-10-CM

## 2024-11-07 DIAGNOSIS — Z23 NEED FOR IMMUNIZATION AGAINST INFLUENZA: ICD-10-CM

## 2024-11-07 DIAGNOSIS — Z00.00 HEALTHCARE MAINTENANCE: ICD-10-CM

## 2024-11-07 PROCEDURE — 99214 OFFICE O/P EST MOD 30 MIN: CPT | Performed by: FAMILY MEDICINE

## 2024-11-07 PROCEDURE — 90662 IIV NO PRSV INCREASED AG IM: CPT | Performed by: FAMILY MEDICINE

## 2024-11-07 PROCEDURE — 1124F ACP DISCUSS-NO DSCNMKR DOCD: CPT | Performed by: FAMILY MEDICINE

## 2024-11-07 PROCEDURE — G0008 ADMIN INFLUENZA VIRUS VAC: HCPCS | Performed by: FAMILY MEDICINE

## 2024-11-07 PROCEDURE — 1159F MED LIST DOCD IN RCRD: CPT | Performed by: FAMILY MEDICINE

## 2024-11-07 RX ORDER — CEPHALEXIN 500 MG/1
500 CAPSULE ORAL 3 TIMES DAILY
Qty: 21 CAPSULE | Refills: 0 | Status: SHIPPED | OUTPATIENT
Start: 2024-11-07 | End: 2024-11-17

## 2024-11-07 NOTE — PROGRESS NOTES
"Subjective     Patient ID: Susannah Quintanilla is a 74 y.o. female who presents for Rash (Around belly button ).  HPI  Had a surgery to repair her hernia. New umbilicus was created. Has a rash and some debris in and around it.    Review of Systems   Constitutional: Negative.    HENT: Negative.     Eyes: Negative.    Respiratory: Negative.     Cardiovascular: Negative.    Gastrointestinal: Negative.    Endocrine: Negative.    Genitourinary: Negative.    Skin:  Positive for rash.   Neurological: Negative.    Psychiatric/Behavioral: Negative.         Objective     Vitals:    11/07/24 1636   BP: 122/82   BP Location: Left arm   Patient Position: Sitting   Pulse: 80   Resp: 16   Temp: 36.6 °C (97.9 °F)   TempSrc: Temporal   SpO2: 98%   Weight: 64.4 kg (142 lb)   Height: 1.702 m (5' 7.01\")        Current Outpatient Medications   Medication Instructions    acetaminophen (TYLENOL 8 HOUR) 650 mg, Every 8 hours PRN    acetaminophen-codeine (Tylenol w/ Codeine #3) 300-30 mg tablet 1 tablet, oral, Every 6 hours PRN    albuterol 90 mcg/actuation inhaler 2 puffs, inhalation, Every 4 hours PRN    calcium carbonate-vitamin D3 500 mg-3.125 mcg (125 unit) tablet tablet 2 tablets, oral, Once daily (morning) M-F (5 days a week)    cephalexin (KEFLEX) 500 mg, oral, 3 times daily    cetirizine (ZYRTEC) 10 mg, Daily PRN    levothyroxine (SYNTHROID, LEVOXYL) 88 mcg, oral, Daily    omeprazole (PRILOSEC) 40 mg, As needed        Physical Exam  Constitutional:       Appearance: Normal appearance.   HENT:      Head: Normocephalic and atraumatic.      Right Ear: Tympanic membrane normal.      Left Ear: Tympanic membrane normal.      Nose: Nose normal.      Mouth/Throat:      Mouth: Mucous membranes are moist.   Cardiovascular:      Rate and Rhythm: Normal rate and regular rhythm.   Pulmonary:      Effort: Pulmonary effort is normal.      Breath sounds: Normal breath sounds.   Abdominal:      General: Abdomen is flat. Bowel sounds are normal.      " Palpations: Abdomen is soft.      Comments: Rash inside and around umbilicus   Neurological:      Mental Status: She is alert.         Assessment/Plan   Diagnoses and all orders for this visit:  Omphalitis in adult  -     cephalexin (Keflex) 500 mg capsule; Take 1 capsule (500 mg) by mouth 3 times a day for 10 days.  Need for immunization against influenza  -     Flu vaccine, trivalent, preservative free, HIGH-DOSE, age 65y+ (Fluzone)  Healthcare maintenance  -     CBC; Future  -     Comprehensive Metabolic Panel; Future  -     Lipid Panel; Future  Mixed hyperlipidemia  -     CBC; Future  -     Comprehensive Metabolic Panel; Future  -     Lipid Panel; Future  -     Thyroid Stimulating Hormone; Future  Acquired hypothyroidism  -     CBC; Future  -     Comprehensive Metabolic Panel; Future  -     Lipid Panel; Future  -     Thyroid Stimulating Hormone; Future

## 2024-11-11 ENCOUNTER — LAB (OUTPATIENT)
Dept: LAB | Facility: LAB | Age: 74
End: 2024-11-11
Payer: MEDICARE

## 2024-11-11 DIAGNOSIS — E78.2 MIXED HYPERLIPIDEMIA: ICD-10-CM

## 2024-11-11 DIAGNOSIS — Z00.00 HEALTHCARE MAINTENANCE: ICD-10-CM

## 2024-11-11 DIAGNOSIS — E03.9 ACQUIRED HYPOTHYROIDISM: ICD-10-CM

## 2024-11-11 LAB
ALBUMIN SERPL BCP-MCNC: 3.9 G/DL (ref 3.4–5)
ALP SERPL-CCNC: 85 U/L (ref 33–136)
ALT SERPL W P-5'-P-CCNC: 8 U/L (ref 7–45)
ANION GAP SERPL CALC-SCNC: 12 MMOL/L (ref 10–20)
AST SERPL W P-5'-P-CCNC: 13 U/L (ref 9–39)
BILIRUB SERPL-MCNC: 0.7 MG/DL (ref 0–1.2)
BUN SERPL-MCNC: 14 MG/DL (ref 6–23)
CALCIUM SERPL-MCNC: 9.3 MG/DL (ref 8.6–10.6)
CHLORIDE SERPL-SCNC: 107 MMOL/L (ref 98–107)
CHOLEST SERPL-MCNC: 189 MG/DL (ref 0–199)
CHOLESTEROL/HDL RATIO: 2.6
CO2 SERPL-SCNC: 28 MMOL/L (ref 21–32)
CREAT SERPL-MCNC: 0.63 MG/DL (ref 0.5–1.05)
EGFRCR SERPLBLD CKD-EPI 2021: >90 ML/MIN/1.73M*2
ERYTHROCYTE [DISTWIDTH] IN BLOOD BY AUTOMATED COUNT: 12.4 % (ref 11.5–14.5)
GLUCOSE SERPL-MCNC: 113 MG/DL (ref 74–99)
HCT VFR BLD AUTO: 44.1 % (ref 36–46)
HDLC SERPL-MCNC: 73 MG/DL
HGB BLD-MCNC: 14.2 G/DL (ref 12–16)
LDLC SERPL CALC-MCNC: 96 MG/DL
MCH RBC QN AUTO: 29.5 PG (ref 26–34)
MCHC RBC AUTO-ENTMCNC: 32.2 G/DL (ref 32–36)
MCV RBC AUTO: 92 FL (ref 80–100)
NON HDL CHOLESTEROL: 116 MG/DL (ref 0–149)
NRBC BLD-RTO: 0 /100 WBCS (ref 0–0)
PLATELET # BLD AUTO: 336 X10*3/UL (ref 150–450)
POTASSIUM SERPL-SCNC: 4.6 MMOL/L (ref 3.5–5.3)
PROT SERPL-MCNC: 6.5 G/DL (ref 6.4–8.2)
RBC # BLD AUTO: 4.81 X10*6/UL (ref 4–5.2)
SODIUM SERPL-SCNC: 142 MMOL/L (ref 136–145)
TRIGL SERPL-MCNC: 102 MG/DL (ref 0–149)
TSH SERPL-ACNC: 3.58 MIU/L (ref 0.44–3.98)
VLDL: 20 MG/DL (ref 0–40)
WBC # BLD AUTO: 5.2 X10*3/UL (ref 4.4–11.3)

## 2024-11-11 PROCEDURE — 80061 LIPID PANEL: CPT

## 2024-11-11 PROCEDURE — 84443 ASSAY THYROID STIM HORMONE: CPT

## 2024-11-11 PROCEDURE — 36415 COLL VENOUS BLD VENIPUNCTURE: CPT

## 2024-11-11 PROCEDURE — 85027 COMPLETE CBC AUTOMATED: CPT

## 2024-11-11 PROCEDURE — 80053 COMPREHEN METABOLIC PANEL: CPT

## 2024-11-12 ASSESSMENT — ENCOUNTER SYMPTOMS
CARDIOVASCULAR NEGATIVE: 1
RESPIRATORY NEGATIVE: 1
CONSTITUTIONAL NEGATIVE: 1
EYES NEGATIVE: 1
ENDOCRINE NEGATIVE: 1
PSYCHIATRIC NEGATIVE: 1
GASTROINTESTINAL NEGATIVE: 1
NEUROLOGICAL NEGATIVE: 1

## 2024-11-14 ENCOUNTER — APPOINTMENT (OUTPATIENT)
Dept: PRIMARY CARE | Facility: CLINIC | Age: 74
End: 2024-11-14
Payer: MEDICARE

## 2024-11-14 VITALS
HEIGHT: 67 IN | RESPIRATION RATE: 20 BRPM | OXYGEN SATURATION: 97 % | WEIGHT: 141 LBS | BODY MASS INDEX: 22.13 KG/M2 | SYSTOLIC BLOOD PRESSURE: 130 MMHG | TEMPERATURE: 97.2 F | HEART RATE: 79 BPM | DIASTOLIC BLOOD PRESSURE: 75 MMHG

## 2024-11-14 DIAGNOSIS — Z00.00 HEALTHCARE MAINTENANCE: ICD-10-CM

## 2024-11-14 DIAGNOSIS — Z00.00 ROUTINE GENERAL MEDICAL EXAMINATION AT HEALTH CARE FACILITY: Primary | ICD-10-CM

## 2024-11-14 LAB
POC APPEARANCE, URINE: CLEAR
POC BILIRUBIN, URINE: NEGATIVE
POC BLOOD, URINE: NEGATIVE
POC COLOR, URINE: YELLOW
POC GLUCOSE, URINE: NEGATIVE MG/DL
POC KETONES, URINE: NEGATIVE MG/DL
POC LEUKOCYTES, URINE: NEGATIVE
POC NITRITE,URINE: NEGATIVE
POC PH, URINE: 6 PH
POC PROTEIN, URINE: NEGATIVE MG/DL
POC SPECIFIC GRAVITY, URINE: 1.02
POC UROBILINOGEN, URINE: 0.2 EU/DL

## 2024-11-14 PROCEDURE — 1159F MED LIST DOCD IN RCRD: CPT | Performed by: FAMILY MEDICINE

## 2024-11-14 PROCEDURE — 81002 URINALYSIS NONAUTO W/O SCOPE: CPT | Performed by: FAMILY MEDICINE

## 2024-11-14 PROCEDURE — 1170F FXNL STATUS ASSESSED: CPT | Performed by: FAMILY MEDICINE

## 2024-11-14 PROCEDURE — 1123F ACP DISCUSS/DSCN MKR DOCD: CPT | Performed by: FAMILY MEDICINE

## 2024-11-14 PROCEDURE — 1036F TOBACCO NON-USER: CPT | Performed by: FAMILY MEDICINE

## 2024-11-14 PROCEDURE — G0439 PPPS, SUBSEQ VISIT: HCPCS | Performed by: FAMILY MEDICINE

## 2024-11-14 PROCEDURE — 99397 PER PM REEVAL EST PAT 65+ YR: CPT | Performed by: FAMILY MEDICINE

## 2024-11-14 ASSESSMENT — ENCOUNTER SYMPTOMS
CONSTITUTIONAL NEGATIVE: 1
RESPIRATORY NEGATIVE: 1
EYES NEGATIVE: 1
NEUROLOGICAL NEGATIVE: 1
ENDOCRINE NEGATIVE: 1
GASTROINTESTINAL NEGATIVE: 1
CARDIOVASCULAR NEGATIVE: 1
PSYCHIATRIC NEGATIVE: 1

## 2024-11-14 ASSESSMENT — ACTIVITIES OF DAILY LIVING (ADL)
GROCERY_SHOPPING: INDEPENDENT
MANAGING_FINANCES: INDEPENDENT
TAKING_MEDICATION: INDEPENDENT
DRESSING: INDEPENDENT
DOING_HOUSEWORK: INDEPENDENT
BATHING: INDEPENDENT

## 2024-11-14 ASSESSMENT — PATIENT HEALTH QUESTIONNAIRE - PHQ9
SUM OF ALL RESPONSES TO PHQ9 QUESTIONS 1 AND 2: 0
2. FEELING DOWN, DEPRESSED OR HOPELESS: NOT AT ALL
1. LITTLE INTEREST OR PLEASURE IN DOING THINGS: NOT AT ALL

## 2024-11-14 NOTE — PROGRESS NOTES
"Subjective   Reason for Visit: Susannah Quintanilla is an 74 y.o. female here for a Medicare Wellness visit.     Past Medical, Surgical, and Family History reviewed and updated in chart.    Reviewed all medications by prescribing practitioner or clinical pharmacist (such as prescriptions, OTCs, herbal therapies and supplements) and documented in the medical record.      Patient Care Team:  Nela Milligan DO as PCP - General  Nela Milligan DO as PCP - [employer] PCP     Review of Systems   Constitutional: Negative.    HENT: Negative.     Eyes: Negative.    Respiratory: Negative.     Cardiovascular: Negative.    Gastrointestinal: Negative.    Endocrine: Negative.    Genitourinary: Negative.    Skin: Negative.    Neurological: Negative.    Psychiatric/Behavioral: Negative.         Objective   Vitals:  /75 (BP Location: Right arm, Patient Position: Sitting)   Pulse 79   Temp 36.2 °C (97.2 °F) (Temporal)   Resp 20   Ht 1.702 m (5' 7.01\")   Wt 64 kg (141 lb)   SpO2 97%   BMI 22.08 kg/m²       Physical Exam  Constitutional:       General: She is not in acute distress.     Appearance: Normal appearance.   HENT:      Head: Normocephalic and atraumatic.      Right Ear: Tympanic membrane normal.      Left Ear: Tympanic membrane normal.      Nose: Nose normal.      Mouth/Throat:      Pharynx: Oropharynx is clear.   Eyes:      Conjunctiva/sclera: Conjunctivae normal.      Pupils: Pupils are equal, round, and reactive to light.   Neck:      Vascular: No carotid bruit.   Cardiovascular:      Rate and Rhythm: Normal rate and regular rhythm.      Heart sounds: Normal heart sounds.   Pulmonary:      Effort: Pulmonary effort is normal.      Breath sounds: Normal breath sounds.   Abdominal:      General: Bowel sounds are normal.      Palpations: Abdomen is soft.   Musculoskeletal:      Cervical back: Neck supple. No tenderness.   Skin:     General: Skin is warm and dry.   Neurological:      General: No " focal deficit present.      Mental Status: She is alert.   Psychiatric:         Mood and Affect: Mood normal.         Behavior: Behavior normal.         Assessment & Plan  Healthcare maintenance    Orders:    POCT UA (nonautomated) manually resulted    Routine general medical examination at health care facility    Orders:    1 Year Follow Up In [facility] - PCP - Wellness Exam; Future

## 2025-01-01 DIAGNOSIS — E03.9 HYPOTHYROIDISM, UNSPECIFIED: ICD-10-CM

## 2025-01-02 RX ORDER — LEVOTHYROXINE SODIUM 88 UG/1
88 TABLET ORAL DAILY
Qty: 90 TABLET | Refills: 3 | Status: SHIPPED | OUTPATIENT
Start: 2025-01-02

## 2025-01-12 ENCOUNTER — OFFICE VISIT (OUTPATIENT)
Dept: URGENT CARE | Age: 75
End: 2025-01-12
Payer: MEDICARE

## 2025-01-12 VITALS
HEART RATE: 72 BPM | RESPIRATION RATE: 16 BRPM | OXYGEN SATURATION: 96 % | SYSTOLIC BLOOD PRESSURE: 151 MMHG | DIASTOLIC BLOOD PRESSURE: 77 MMHG | TEMPERATURE: 98.1 F

## 2025-01-12 DIAGNOSIS — R09.81 NASAL CONGESTION: ICD-10-CM

## 2025-01-12 DIAGNOSIS — J98.8 RESPIRATORY TRACT INFECTION: Primary | ICD-10-CM

## 2025-01-12 LAB — POC SARS-COV-2 AG BINAX: NORMAL

## 2025-01-12 PROCEDURE — 87811 SARS-COV-2 COVID19 W/OPTIC: CPT | Performed by: PHYSICIAN ASSISTANT

## 2025-01-12 PROCEDURE — 1159F MED LIST DOCD IN RCRD: CPT | Performed by: PHYSICIAN ASSISTANT

## 2025-01-12 PROCEDURE — 1036F TOBACCO NON-USER: CPT | Performed by: PHYSICIAN ASSISTANT

## 2025-01-12 PROCEDURE — 1123F ACP DISCUSS/DSCN MKR DOCD: CPT | Performed by: PHYSICIAN ASSISTANT

## 2025-01-12 PROCEDURE — 99213 OFFICE O/P EST LOW 20 MIN: CPT | Performed by: PHYSICIAN ASSISTANT

## 2025-01-12 PROCEDURE — 1160F RVW MEDS BY RX/DR IN RCRD: CPT | Performed by: PHYSICIAN ASSISTANT

## 2025-01-12 RX ORDER — AZITHROMYCIN 250 MG/1
TABLET, FILM COATED ORAL
Qty: 6 TABLET | Refills: 0 | Status: SHIPPED | OUTPATIENT
Start: 2025-01-12 | End: 2025-01-17

## 2025-01-12 NOTE — PATIENT INSTRUCTIONS
You were seen for cold like symptoms.  You most likely have a upper respiratory tract infection.  I recommend supportive therapy with the following medications below.    PLAN:  1.  Please take antibiotic as prescribed    2.  You can use Tea and honey for cough. This is known to work better than most OTC medications.    3.  Mucinex can break up congestion in adults.        Aller-Anuel or over-the-counter children's cold medication such as Dimetapp can be beneficial for symptoms in kids.    4.  Stay well-hydrated and drink lots of fluids.    5.  Follow up with your primary care physician in the next few days for reevaluation, especially if symptoms are not improving.    6.  Return to the urgent care or emergency department if symptoms worsen or new symptoms develop.    Based on clinical exam findings and history you most likely have a upper respiratory tract infection.  Your initial illness was likely caused by a virus that progressed to a secondary bacterial infection.  You are contagious as soon as the symptoms start.  Your symptoms may persist up to 2-3 weeks.  Symptoms usually peak by days 3 or 5.  Typical symptoms include nasal congestion, a runny nose, scratchy throat, cough, and irritability. The diagnosis is based on symptoms. Good hand hygiene is the best way to prevent these infections, and routine vaccination can help prevent influenza. Treatment aims to relieve symptoms. Rest and fluids. Tylenol and/or ibuprofen for fever and pain. Over the counter decongestants or mucolytics.  All do not take medications that may be contraindicated by another medical condition.

## 2025-01-12 NOTE — PROGRESS NOTES
Subjective   Patient ID: Susannah Quintanilla is a 74 y.o. female. They present today with a chief complaint of Nasal Congestion and Headache (Hx bronchitis).    CC: URI symptoms x 6  days    HPI: Patient presenting for URI symptoms.  Symptoms include runny nose, congestion, and  dry cough.  No fever, chills, myalgias, abdominal pain, nausea, vomiting, diarrhea, rash.  No chest pain or shortness of breath.  No history of clots or clotting disorders.  No lower extremity swelling edema or pain.  No recent travel or surgeries.    Past Medical History  Allergies as of 01/12/2025    (No Known Allergies)       (Not in a hospital admission)      Past Medical History:   Diagnosis Date    GERD (gastroesophageal reflux disease)     Hypothyroidism     Umbilical hernia        Past Surgical History:   Procedure Laterality Date    BREAST BIOPSY Right 04/20/2023 2023 right breast benign core biopsy with clip placement    OTHER SURGICAL HISTORY  11/20/2019    Cataract surgery    OTHER SURGICAL HISTORY  11/20/2019    Cholecystectomy    OTHER SURGICAL HISTORY  11/20/2019    Loop electrosurgical excision procedure        reports that she has never smoked. She has never used smokeless tobacco. She reports current alcohol use. She reports that she does not use drugs.    Review of Systems  Review of Systems    After reviewing all body systems I have documented pertinent findings above in the history.  All other Systems reviewed and are negative for complaint.  Pertinent positive and negatives are listed in the above HPI.    Objective    Vitals:    01/12/25 1225   BP: 151/77   Pulse: 72   Resp: 16   Temp: 36.7 °C (98.1 °F)   SpO2: 96%     No LMP recorded. Patient is postmenopausal.  Physical Exam    General: Alert, oriented, and cooperative.  No acute distress. Well developed, well nourished.     Skin: Skin is warm, and dry. No rashes or lesions.    Eyes: Sclera and conjunctivae normal     Ears: TM´s are intact, pink/grey, with mild  effusions bilaterally.  No significant erythema.  No hemotympanum or rupture. Bilateral auricle, helix, and tragus without inflammation or erythema.  No mastoid erythema, or tenderness.  No deformities. Right and left canal negative for erythema, or discharge.  Hearing is grossly intact bilaterally    Mouth/Throat: Pharynx is erythematous.  Tonsils are equal in size.  No exudates.  No angioedema of the lips or tongue.  No respiratory compromise, tripoding, drooling, muffled voice,  stridor, or trismus.     Neck: Supple.     Cardiac: Regular rate  and rhythm    Respiratory:  No acute respiratory distress.  Regular rate of breathing.  No accessory muscle use.  No tripoding.  Lungs are clear bilaterally.    Abdomen: Soft, nontender.    Musculoskeletal: Upper and lower extremities are atraumatic in appearance without deformity, erythema, edema, and atrophy. No crepitus, or tenderness. Compartments are all soft.    Procedures    Point of Care Test & Imaging Results from this visit  Results for orders placed or performed in visit on 01/12/25   POCT Covid-19 Rapid Antigen    Collection Time: 01/12/25 12:29 PM   Result Value Ref Range    POC AVERY-COV-2 AG  Presumptive negative test for SARS-CoV-2 (no antigen detected)     Presumptive negative test for SARS-CoV-2 (no antigen detected)     No results found.    Diagnostic study results (if any) were reviewed by Issa Lyn PA-C.    Assessment/Plan   Allergies, medications, history, and pertinent labs/EKGs/Imaging reviewed by Issa Lyn PA-C.     MDM:  Patient presenting for URI type symptoms x 6 days.  No relief despite taking over-the-counter medications.  Patient overall looks well.  Speaks in complete sentences.  No evidence of acute distress or respiratory compromise.  No tripoding. No nasal flaring. No clinical signs or history to suggest meningitis, David´s, peritonsillar abscess, epiglottitis, or asthma.  Due length and worsening of  symptoms a bacterial  respiratory tract infection is considered the most likely cause.  Through shared decision making the patient was prescribed an antimicrobial as a treatment measure.  Advised supportive therapy with over the counter medication and follow-up with a PCP in the next few days. Pt/family instructed to return to the urgent care or emergency department if symptoms worsen or if new symptoms develop. Patient/family expressed understanding and consented to the above plan. No barriers of communication were apparent.    Orders and Diagnoses  Diagnoses and all orders for this visit:  Respiratory tract infection  -     azithromycin (Zithromax) 250 mg tablet; Take 2 tabs (500 mg) by mouth today, than 1 daily for 4 days.  Nasal congestion  -     POCT Covid-19 Rapid Antigen      Medical Admin Record      Patient disposition: Home    Electronically signed by Issa Lyn PA-C  12:36 PM

## 2025-05-23 ENCOUNTER — OFFICE VISIT (OUTPATIENT)
Dept: URGENT CARE | Age: 75
End: 2025-05-23
Payer: MEDICARE

## 2025-05-23 VITALS
TEMPERATURE: 97.8 F | HEART RATE: 83 BPM | OXYGEN SATURATION: 97 % | DIASTOLIC BLOOD PRESSURE: 79 MMHG | SYSTOLIC BLOOD PRESSURE: 147 MMHG | RESPIRATION RATE: 16 BRPM

## 2025-05-23 DIAGNOSIS — J06.9 UPPER RESPIRATORY TRACT INFECTION, UNSPECIFIED TYPE: Primary | ICD-10-CM

## 2025-05-23 PROCEDURE — 99213 OFFICE O/P EST LOW 20 MIN: CPT | Performed by: PHYSICIAN ASSISTANT

## 2025-05-23 PROCEDURE — 1160F RVW MEDS BY RX/DR IN RCRD: CPT | Performed by: PHYSICIAN ASSISTANT

## 2025-05-23 PROCEDURE — 1159F MED LIST DOCD IN RCRD: CPT | Performed by: PHYSICIAN ASSISTANT

## 2025-05-23 PROCEDURE — 1036F TOBACCO NON-USER: CPT | Performed by: PHYSICIAN ASSISTANT

## 2025-05-23 RX ORDER — BENZONATATE 100 MG/1
100 CAPSULE ORAL 3 TIMES DAILY PRN
Qty: 42 CAPSULE | Refills: 0 | Status: SHIPPED | OUTPATIENT
Start: 2025-05-23 | End: 2025-06-22

## 2025-05-23 RX ORDER — AMOXICILLIN AND CLAVULANATE POTASSIUM 875; 125 MG/1; MG/1
1 TABLET, FILM COATED ORAL 2 TIMES DAILY
Qty: 14 TABLET | Refills: 0 | Status: SHIPPED | OUTPATIENT
Start: 2025-05-23 | End: 2025-05-30

## 2025-05-23 NOTE — PATIENT INSTRUCTIONS
You were seen for cold like symptoms.  You most likely have a upper respiratory tract infection.  I recommend supportive therapy with the following medications below.    PLAN:  1.  Please take antibiotic as prescribed.  Augmentin.    2.  Take Tessalon Perle as directed for cough.    3.  Mucinex can break up congestion in adults.       4.  Stay well-hydrated and drink lots of fluids.    5.  Follow up with your primary care physician in the next few days for reevaluation, especially if symptoms are not improving.    6.  Return to the urgent care or emergency department if symptoms worsen or new symptoms develop.    Based on clinical exam findings and history you most likely have a upper respiratory tract infection.  Your initial illness was likely caused by a virus that progressed to a secondary bacterial infection.  You are contagious as soon as the symptoms start.  Your symptoms may persist up to 2-3 weeks.  Symptoms usually peak by days 3 or 5.  Typical symptoms include nasal congestion, a runny nose, scratchy throat, cough, and irritability. The diagnosis is based on symptoms. Good hand hygiene is the best way to prevent these infections, and routine vaccination can help prevent influenza. Treatment aims to relieve symptoms. Rest and fluids. Tylenol and/or ibuprofen for fever and pain. Over the counter decongestants or mucolytics.  All do not take medications that may be contraindicated by another medical condition.

## 2025-05-23 NOTE — PROGRESS NOTES
Subjective   Patient ID: Susannah Quintanilla is a 75 y.o. female. They present today with a chief complaint of Sinus Problem (X 1 week).    CC: URI symptoms x 7 days    HPI: Patient presenting for URI symptoms.  Symptoms include runny nose, congestion, and cough.  No fever, chills, myalgias, abdominal pain, nausea, vomiting, diarrhea, rash.  No chest pain or shortness of breath.  No history of clots or clotting disorders.  No lower extremity swelling edema or pain.  No recent travel or surgeries.    Past Medical History  Allergies as of 05/23/2025    (No Known Allergies)     Prescriptions Prior to Admission[1]    Medical History[2]    Surgical History[3]     reports that she has never smoked. She has never used smokeless tobacco. She reports current alcohol use. She reports that she does not use drugs.    Review of Systems  Review of Systems    After reviewing all body systems I have documented pertinent findings above in the history.  All other Systems reviewed and are negative for complaint.  Pertinent positive and negatives are listed in the above HPI.    Objective    Vitals:    05/23/25 0821   BP: 147/79   Pulse: 83   Resp: 16   Temp: 36.6 °C (97.8 °F)   SpO2: 97%     No LMP recorded. Patient is postmenopausal.  Physical Exam    General: Alert, oriented, and cooperative.  No acute distress. Well developed, well nourished.     Skin: Skin is warm, and dry. No rashes or lesions.    Eyes: Sclera and conjunctivae normal     Ears: TM´s are intact, pink/grey, with mild effusions bilaterally.  No significant erythema.  No hemotympanum or rupture. Bilateral auricle, helix, and tragus without inflammation or erythema.  No mastoid erythema, or tenderness.  No deformities. Right and left canal negative for erythema, or discharge.  Hearing is grossly intact bilaterally    Mouth/Throat: Pharynx is erythematous.  Tonsils are equal in size.  No exudates.  No angioedema of the lips or tongue.  No respiratory compromise, tripoding,  drooling, muffled voice,  stridor, or trismus.     Neck: Supple.     Cardiac: Regular rate and rhythm    Respiratory:  No acute respiratory distress.  Regular rate of breathing.  No accessory muscle use.  No tripoding.  Lungs are clear bilaterally.    Abdomen: Soft, nontender.    Musculoskeletal: Upper and lower extremities are atraumatic in appearance without deformity, erythema, edema, and atrophy. No crepitus, or tenderness. Compartments are all soft.    Procedures    Point of Care Test & Imaging Results from this visit    Imaging  No results found.    Cardiology, Vascular, and Other Imaging  No other imaging results found for the past 2 days      Diagnostic study results (if any) were reviewed by Issa Lyn PA-C.    Assessment/Plan   Allergies, medications, history, and pertinent labs/EKGs/Imaging reviewed by Issa Lyn PA-C.     MDM:  Patient presenting for URI type symptoms x 7 days.  No relief despite taking over-the-counter medications.  Patient overall looks well.  Speaks in complete sentences.  No evidence of acute distress or respiratory compromise.  No tripoding. No nasal flaring. No clinical signs or history to suggest meningitis, David´s, peritonsillar abscess, epiglottitis, or asthma.  Due length and worsening of  symptoms a bacterial respiratory tract infection is considered the most likely cause.  Through shared decision making the patient was prescribed an antimicrobial as a treatment measure.  Advised supportive therapy with over the counter medication and follow-up with a PCP in the next few days. Pt/family instructed to return to the urgent care or emergency department if symptoms worsen or if new symptoms develop. Patient/family expressed understanding and consented to the above plan. No barriers of communication were apparent.      Orders and Diagnoses  Diagnoses and all orders for this visit:  Upper respiratory tract infection, unspecified type  -     amoxicillin-clavulanate  (Augmentin) 875-125 mg tablet; Take 1 tablet by mouth 2 times a day for 7 days.  -     benzonatate (Tessalon) 100 mg capsule; Take 1 capsule (100 mg) by mouth 3 times a day as needed for cough. Do not crush or chew.      Medical Admin Record      Patient disposition: Home    Electronically signed by Issa Lyn PA-C  8:33 AM         [1] (Not in a hospital admission)   [2]   Past Medical History:  Diagnosis Date    GERD (gastroesophageal reflux disease)     Hypothyroidism     Umbilical hernia    [3]   Past Surgical History:  Procedure Laterality Date    BREAST BIOPSY Right 04/20/2023 2023 right breast benign core biopsy with clip placement    OTHER SURGICAL HISTORY  11/20/2019    Cataract surgery    OTHER SURGICAL HISTORY  11/20/2019    Cholecystectomy    OTHER SURGICAL HISTORY  11/20/2019    Loop electrosurgical excision procedure

## 2025-10-16 ENCOUNTER — APPOINTMENT (OUTPATIENT)
Facility: CLINIC | Age: 75
End: 2025-10-16
Payer: MEDICARE

## (undated) DEVICE — SOLUTION, IRRIGATION, STERILE WATER, 1000 ML, POUR BOTTLE

## (undated) DEVICE — GLOVE, SURGICAL, PROTEXIS PI , 7.5, PF, LF

## (undated) DEVICE — Device

## (undated) DEVICE — PREP TRAY, VAGINAL

## (undated) DEVICE — DRESSING, GAUZE, SUPER KERLIX, 6X6

## (undated) DEVICE — TOWEL PACK, STERILE, 4/PACK, BLUE

## (undated) DEVICE — SUTURE, VICRYL, 2-0, 27 IN, SH, UNDYED

## (undated) DEVICE — STAPLER, SKIN, PLUS, WIDE, 35

## (undated) DEVICE — SOLUTION, IRRIGATION, SODIUM CHLORIDE 0.9%, 1000 ML, POUR BOTTLE

## (undated) DEVICE — SUTURE, PROLENE, 0, 30 IN, CT1, BLUE

## (undated) DEVICE — SUTURE, VICRYL PLUS, 4-0, 27 IN, PS-2

## (undated) DEVICE — PREP, SKIN, BACTOSHEILD, 4%, 4OZ

## (undated) DEVICE — DRAPE, SHEET, ENDOSCOPY, GENERAL, FENESTRATED, ARMBOARD COVER, 98 X 123.5 IN, DISPOSABLE, LF, STERILE